# Patient Record
Sex: MALE | Race: WHITE | NOT HISPANIC OR LATINO | Employment: FULL TIME | URBAN - METROPOLITAN AREA
[De-identification: names, ages, dates, MRNs, and addresses within clinical notes are randomized per-mention and may not be internally consistent; named-entity substitution may affect disease eponyms.]

---

## 2023-08-27 PROBLEM — R20.9 SENSORY PROBLEMS WITH LIMBS: Status: ACTIVE | Noted: 2023-08-27

## 2023-08-27 PROBLEM — M79.10 MYALGIA: Status: ACTIVE | Noted: 2023-08-27

## 2023-08-27 PROBLEM — R45.0 JITTERY: Status: ACTIVE | Noted: 2023-08-27

## 2023-08-27 PROBLEM — E78.5 HYPERLIPIDEMIA: Status: ACTIVE | Noted: 2023-08-27

## 2023-08-27 PROBLEM — F32.A DEPRESSION: Status: ACTIVE | Noted: 2023-08-27

## 2023-08-27 PROBLEM — K21.9 ESOPHAGEAL REFLUX: Status: ACTIVE | Noted: 2023-08-27

## 2023-08-27 PROBLEM — E11.9 DIABETES MELLITUS WITHOUT COMPLICATION (MULTI): Status: ACTIVE | Noted: 2023-08-27

## 2023-08-27 PROBLEM — E11.9 TYPE 2 DIABETES MELLITUS (MULTI): Status: ACTIVE | Noted: 2023-08-27

## 2023-08-27 PROBLEM — R19.7 DIARRHEA: Status: ACTIVE | Noted: 2023-08-27

## 2023-08-27 PROBLEM — E55.9 VITAMIN D DEFICIENCY: Status: ACTIVE | Noted: 2023-08-27

## 2023-08-27 PROBLEM — H46.9 OPTIC NEURITIS: Status: ACTIVE | Noted: 2023-08-27

## 2023-08-27 PROBLEM — J30.1 HAY FEVER: Status: ACTIVE | Noted: 2023-08-27

## 2023-08-27 PROBLEM — N20.0 NEPHROLITHIASIS: Status: ACTIVE | Noted: 2023-08-27

## 2023-08-27 PROBLEM — E16.2 HYPOGLYCEMIA: Status: ACTIVE | Noted: 2023-08-27

## 2023-08-27 PROBLEM — R68.82 DECREASED LIBIDO: Status: ACTIVE | Noted: 2023-08-27

## 2023-08-27 PROBLEM — M50.20 OTHER CERVICAL DISC DISPLACEMENT, UNSPECIFIED CERVICAL REGION: Status: ACTIVE | Noted: 2023-08-27

## 2023-08-27 PROBLEM — L03.114 CELLULITIS OF LEFT UPPER EXTREMITY: Status: ACTIVE | Noted: 2023-08-27

## 2023-08-27 PROBLEM — N52.9 ERECTILE DYSFUNCTION: Status: ACTIVE | Noted: 2023-08-27

## 2023-08-27 PROBLEM — K58.9 IRRITABLE BOWEL SYNDROME: Status: ACTIVE | Noted: 2023-08-27

## 2023-08-27 PROBLEM — I05.9 MITRAL VALVE DISORDER: Status: ACTIVE | Noted: 2023-08-27

## 2023-08-27 PROBLEM — F41.9 ANXIETY DISORDER: Status: ACTIVE | Noted: 2023-08-27

## 2023-08-27 PROBLEM — R53.83 FATIGUE: Status: ACTIVE | Noted: 2023-08-27

## 2023-08-27 PROBLEM — H81.10 BENIGN PAROXYSMAL POSITIONAL VERTIGO: Status: ACTIVE | Noted: 2023-08-27

## 2023-08-27 PROBLEM — E78.5 DYSLIPIDEMIA: Status: ACTIVE | Noted: 2023-08-27

## 2023-08-27 RX ORDER — SIMVASTATIN 40 MG/1
40 TABLET, FILM COATED ORAL EVERY EVENING
COMMUNITY
Start: 2018-08-16 | End: 2023-11-01 | Stop reason: ENTERED-IN-ERROR

## 2023-08-27 RX ORDER — METFORMIN HYDROCHLORIDE 1000 MG/1
1000 TABLET ORAL
COMMUNITY
Start: 2015-12-15 | End: 2024-05-24 | Stop reason: SDUPTHER

## 2023-08-27 RX ORDER — NITROGLYCERIN 4 MG/G
OINTMENT RECTAL DAILY
COMMUNITY
Start: 2021-05-28 | End: 2024-02-14 | Stop reason: ALTCHOICE

## 2023-08-27 RX ORDER — SERTRALINE HYDROCHLORIDE 100 MG/1
100 TABLET, FILM COATED ORAL DAILY
COMMUNITY
End: 2024-05-24 | Stop reason: SDUPTHER

## 2023-08-27 RX ORDER — FLUTICASONE PROPIONATE 50 MCG
2 SPRAY, SUSPENSION (ML) NASAL DAILY
COMMUNITY
Start: 2012-09-05 | End: 2024-03-27 | Stop reason: SDUPTHER

## 2023-08-27 RX ORDER — NAPROXEN SODIUM 220 MG/1
TABLET ORAL
COMMUNITY
End: 2024-02-14 | Stop reason: ALTCHOICE

## 2023-08-27 RX ORDER — SERTRALINE HYDROCHLORIDE 50 MG/1
125 TABLET, FILM COATED ORAL NIGHTLY
COMMUNITY
Start: 2018-02-12 | End: 2023-11-01 | Stop reason: ALTCHOICE

## 2023-08-27 RX ORDER — CHOLECALCIFEROL (VITAMIN D3) 125 MCG
CAPSULE ORAL
COMMUNITY
End: 2023-11-01 | Stop reason: ALTCHOICE

## 2023-08-27 RX ORDER — ATORVASTATIN CALCIUM 40 MG/1
40 TABLET, FILM COATED ORAL DAILY
COMMUNITY
End: 2024-04-08

## 2023-08-27 RX ORDER — INSULIN DEGLUDEC 100 U/ML
40 INJECTION, SOLUTION SUBCUTANEOUS DAILY
COMMUNITY
End: 2023-10-23 | Stop reason: SDUPTHER

## 2023-08-27 RX ORDER — METHYLCELLULOSE 500 MG/1
1 TABLET ORAL DAILY
COMMUNITY

## 2023-08-27 RX ORDER — PEN NEEDLE, DIABETIC 30 GX3/16"
NEEDLE, DISPOSABLE MISCELLANEOUS DAILY
COMMUNITY
Start: 2020-03-12

## 2023-08-27 RX ORDER — TADALAFIL 20 MG/1
20 TABLET ORAL DAILY PRN
COMMUNITY
Start: 2021-03-31 | End: 2024-02-14 | Stop reason: ALTCHOICE

## 2023-08-27 RX ORDER — SAXAGLIPTIN 5 MG/1
1 TABLET, FILM COATED ORAL DAILY
COMMUNITY
Start: 2017-12-01 | End: 2023-11-01 | Stop reason: ENTERED-IN-ERROR

## 2023-10-23 ENCOUNTER — TELEPHONE (OUTPATIENT)
Dept: PRIMARY CARE | Facility: CLINIC | Age: 53
End: 2023-10-23
Payer: COMMERCIAL

## 2023-10-23 DIAGNOSIS — E11.9 DIABETES MELLITUS WITHOUT COMPLICATION (MULTI): Primary | ICD-10-CM

## 2023-10-23 RX ORDER — INSULIN DEGLUDEC 100 U/ML
40 INJECTION, SOLUTION SUBCUTANEOUS DAILY
Qty: 15 ML | Refills: 11 | Status: SHIPPED | OUTPATIENT
Start: 2023-10-23 | End: 2023-11-01 | Stop reason: DRUGHIGH

## 2023-10-23 NOTE — TELEPHONE ENCOUNTER
Refill     [facility]     Tresiba Flex Touch injectable pen  Pt uses 22 units daily  90 day rx w/ 3 rf's    Next visit 11/1/2023

## 2023-10-28 ENCOUNTER — LAB (OUTPATIENT)
Dept: LAB | Facility: LAB | Age: 53
End: 2023-10-28
Payer: COMMERCIAL

## 2023-10-28 DIAGNOSIS — E55.9 VITAMIN D DEFICIENCY, UNSPECIFIED: ICD-10-CM

## 2023-10-28 DIAGNOSIS — E11.9 TYPE 2 DIABETES MELLITUS WITHOUT COMPLICATIONS (MULTI): Primary | ICD-10-CM

## 2023-10-28 LAB
25(OH)D3 SERPL-MCNC: 47 NG/ML (ref 31–100)
CHOLEST SERPL-MCNC: 171 MG/DL (ref 133–200)
CHOLEST/HDLC SERPL: 4.6 {RATIO}
EST. AVERAGE GLUCOSE BLD GHB EST-MCNC: 180 MG/DL
HBA1C MFR BLD: 7.9 %
HDLC SERPL-MCNC: 37 MG/DL
LDLC SERPL CALC-MCNC: 76 MG/DL (ref 65–130)
TRIGL SERPL-MCNC: 289 MG/DL (ref 40–150)

## 2023-10-28 PROCEDURE — 82306 VITAMIN D 25 HYDROXY: CPT

## 2023-10-28 PROCEDURE — 80061 LIPID PANEL: CPT

## 2023-10-28 PROCEDURE — 83036 HEMOGLOBIN GLYCOSYLATED A1C: CPT

## 2023-10-28 PROCEDURE — 36415 COLL VENOUS BLD VENIPUNCTURE: CPT

## 2023-10-30 ASSESSMENT — ENCOUNTER SYMPTOMS
ABDOMINAL PAIN: 0
SHORTNESS OF BREATH: 0
FEVER: 0

## 2023-10-30 NOTE — PROGRESS NOTES
[facility]: [facility]  PROGRESS NOTE      Doug Peter is a 53 y.o. male that is presenting today for follow-up.    Assessment/Plan   Diagnoses and all orders for this visit:  Diabetes mellitus without complication (CMS/[facility])  -     insulin degludec (Tresiba FlexTouch U-100) 100 unit/mL (3 mL) injection; Inject 25 Units under the skin once daily.  Dyslipidemia  Anxiety disorder, unspecified type  Erectile dysfunction, unspecified erectile dysfunction type  Vitamin D deficiency  Nephrolithiasis  Other orders  -     Pneumococcal polysaccharide vaccine, 23-valent, age 2 years and older (PNEUMOVAX 23)  -     Follow Up In Primary Care - Established; Future    Would titrate up the basal insulin, options would be to add GLP-1 or prandial insulin.  Will increase to 25 units of the Tresiba and then can increase by 2 units every 3 days until the morning sugar is down around 120.  He knows that he is do better with the diet and get back into an exercise routine.    He also has some intermittent right shoulder pain, he can use over-the-counter anti-inflammatory medication and try to rest it over the next few months and if its not better will refer for orthopedics evaluation.  Subjective   For follow-up on diabetes.  The A1c was 8.3% at last check, now down to 7.9%.    He is having a lot of postprandial hyperglycemia, some of which is due to dietary indiscretion.  He is also not exercising as much as he was.    Review of Systems   Constitutional:  Negative for fever.   Respiratory:  Negative for shortness of breath.    Cardiovascular:  Negative for chest pain.   Gastrointestinal:  Negative for abdominal pain.   All other systems reviewed and are negative.     Objective   Vitals:    11/01/23 1621   BP: 120/82   Pulse: 81   Temp: 36.6 °C (97.8 °F)   SpO2: 96%      Body mass index is 25.94 kg/m².  Physical Exam  Vitals reviewed.   Constitutional:       Appearance: Normal appearance.   Cardiovascular:       "Rate and Rhythm: Normal rate and regular rhythm.      Heart sounds: No murmur heard.  Pulmonary:      Breath sounds: Normal breath sounds. No wheezing, rhonchi or rales.   Musculoskeletal:      Right lower leg: No edema.      Left lower leg: No edema.     Bilateral ears clear, tympanic membrane is normal    Diabetic foot examination:  Skin: No open areas or ulceration; scattered callus  Sensation: Intact to monofilament testing  Vascular: Palpable DP/PT pulses bilaterally      Diagnostic Results   Lab Results   Component Value Date    GLUCOSE 187 (H) 06/30/2023    CALCIUM 9.1 06/30/2023     06/30/2023    K 3.9 06/30/2023    CO2 26 06/30/2023     06/30/2023    BUN 15 06/30/2023    CREATININE 0.9 06/30/2023     Lab Results   Component Value Date    ALT 27 06/30/2023    AST 23 06/30/2023    ALKPHOS 90 06/30/2023    BILITOT 0.3 06/30/2023     Lab Results   Component Value Date    WBC 4.8 06/30/2023    HGB 16.0 06/30/2023    HCT 49.9 06/30/2023    MCV 85.0 06/30/2023     06/30/2023     Lab Results   Component Value Date    CHOL 171 10/28/2023    CHOL 204 (H) 06/30/2023    CHOL 145 09/21/2021     Lab Results   Component Value Date    HDL 37.0 (L) 10/28/2023    HDL 35 (L) 06/30/2023    HDL 36 (L) 09/21/2021     Lab Results   Component Value Date    LDLCALC 76 10/28/2023    LDLCALC  06/30/2023     Unable to report calculated LDL due to increased triglycerides. Direct    LDLCALC 70 09/21/2021     Lab Results   Component Value Date    TRIG 289 (H) 10/28/2023    TRIG 500 (H) 06/30/2023    TRIG 197 (H) 09/21/2021     No components found for: \"CHOLHDL\"  Lab Results   Component Value Date    HGBA1C 7.9 (H) 10/28/2023     Other labs not included in the list above were reviewed either before or during this encounter.    History    History reviewed. No pertinent past medical history.  Past Surgical History:   Procedure Laterality Date    LITHOTRIPSY  08/08/2013    Renal Lithotripsy    US GUIDED PERCUTANEOUS " PLACEMENT  2/25/2021    US GUIDED PERCUTANEOUS PLACEMENT [facility]     Family History   Problem Relation Name Age of Onset    Hypertension Mother      Prostate cancer Father      Diabetes Father       Social History     Socioeconomic History    Marital status:      Spouse name: Not on file    Number of children: Not on file    Years of education: Not on file    Highest education level: Not on file   Occupational History    Not on file   Tobacco Use    Smoking status: Never    Smokeless tobacco: Never   Substance and Sexual Activity    Alcohol use: Never    Drug use: Never    Sexual activity: Not on file   Other Topics Concern    Not on file   Social History Narrative    Not on file     Social Determinants of Health     Financial Resource Strain: Not on file   Food Insecurity: Not on file   Transportation Needs: Not on file   Physical Activity: Not on file   Stress: Not on file   Social Connections: Not on file   Intimate Partner Violence: Not on file   Housing Stability: Not on file     Allergies   Allergen Reactions    Glimepiride Other     Irritability    Glipizide Other     Irritability    Linagliptin Other     Irritability    Niacin Other     Head tingles    Pioglitazone Diarrhea and Other     Myalgia     Current Outpatient Medications on File Prior to Visit   Medication Sig Dispense Refill    atorvastatin (Lipitor) 40 mg tablet Take 1 tablet (40 mg) by mouth once daily.      blood sugar diagnostic strip twice a day.      empagliflozin (Jardiance) 25 mg Take 1 tablet (25 mg) by mouth once daily.      fluticasone (Flonase) 50 mcg/actuation nasal spray Administer 2 sprays into each nostril once daily.      folic acid/multivit-min/lutein (CENTRUM SILVER ORAL) Take by mouth.      metFORMIN (Glucophage) 1,000 mg tablet Take 1 tablet (1,000 mg) by mouth 2 times a day with meals.      methylcellulose, laxative, (CitruceL) 500 mg tablet Take 1 tablet by mouth 2 times a day.      nitroglycerin (Rectiv)  "0.4 % (w/w) rectal ointment Insert into the rectum once daily.      omega 3-dha-epa-fish oil (Fish OiL) 1,200 (144-216) mg capsule Take by mouth.      pen needle, diabetic 32 gauge x 5/32\" needle Inject under the skin once daily.      sertraline (Zoloft) 100 mg tablet Take 1 tablet (100 mg) by mouth once daily.      tadalafil 20 mg tablet Take 1 tablet (20 mg) by mouth once daily as needed.      [DISCONTINUED] insulin degludec (Tresiba FlexTouch U-100) 100 unit/mL (3 mL) injection Inject 40 Units under the skin once daily. 15 mL 11    [DISCONTINUED] canagliflozin (Invokana) 100 mg Take 1 tablet (100 mg) by mouth once daily.      [DISCONTINUED] cholecalciferol (Vitamin D-3) 125 MCG (5000 UT) capsule Take by mouth.      [DISCONTINUED] multivit-min/iron/folic acid/K (ADULTS MULTIVITAMIN ORAL) Take by mouth.      [DISCONTINUED] sAXagliptin (Onglyza) 5 mg tablet Take 1 tablet (5 mg) by mouth once daily.      [DISCONTINUED] sertraline (Zoloft) 50 mg tablet Take 2.5 tablets (125 mg) by mouth once daily at bedtime.      [DISCONTINUED] simvastatin (Zocor) 40 mg tablet Take 1 tablet (40 mg) by mouth once daily in the evening.       No current facility-administered medications on file prior to visit.     Immunization History   Administered Date(s) Administered    Flu vaccine (IIV4), preservative free *Check age/dose* 10/05/2021    Hepatitis B vaccine, adult (RECOMBIVAX, ENGERIX) 09/10/2019, 10/16/2019, 06/19/2020    Influenza, injectable, MDCK, preservative free, quadrivalent 10/05/2022    Influenza, injectable, quadrivalent 10/03/2019    Influenza, seasonal, injectable 11/07/2014    Pfizer COVID-19 vaccine, bivalent, age 12 years and older (30 mcg/0.3 mL) 10/19/2022    Pfizer Gray Cap SARS-CoV-2 04/15/2022    Pfizer Purple Cap SARS-CoV-2 03/25/2021, 04/22/2021, 10/26/2021    Pneumococcal polysaccharide vaccine, 23-valent, age 2 years and older (PNEUMOVAX 23) 02/07/2018    Tdap vaccine, age 7 year and older (BOOSTRIX) " 06/12/2019, 08/28/2019, 09/11/2019     Patient's medical history was reviewed and updated either before or during this encounter.    Rajat Samano MD

## 2023-11-01 ENCOUNTER — OFFICE VISIT (OUTPATIENT)
Dept: PRIMARY CARE | Facility: CLINIC | Age: 53
End: 2023-11-01
Payer: COMMERCIAL

## 2023-11-01 VITALS
OXYGEN SATURATION: 96 % | DIASTOLIC BLOOD PRESSURE: 82 MMHG | WEIGHT: 202 LBS | BODY MASS INDEX: 25.94 KG/M2 | SYSTOLIC BLOOD PRESSURE: 120 MMHG | TEMPERATURE: 97.8 F | HEART RATE: 81 BPM

## 2023-11-01 DIAGNOSIS — E78.5 DYSLIPIDEMIA: ICD-10-CM

## 2023-11-01 DIAGNOSIS — E55.9 VITAMIN D DEFICIENCY: ICD-10-CM

## 2023-11-01 DIAGNOSIS — N52.9 ERECTILE DYSFUNCTION, UNSPECIFIED ERECTILE DYSFUNCTION TYPE: ICD-10-CM

## 2023-11-01 DIAGNOSIS — N20.0 NEPHROLITHIASIS: ICD-10-CM

## 2023-11-01 DIAGNOSIS — F41.9 ANXIETY DISORDER, UNSPECIFIED TYPE: ICD-10-CM

## 2023-11-01 DIAGNOSIS — E11.9 DIABETES MELLITUS WITHOUT COMPLICATION (MULTI): Primary | ICD-10-CM

## 2023-11-01 PROCEDURE — 1036F TOBACCO NON-USER: CPT | Performed by: INTERNAL MEDICINE

## 2023-11-01 PROCEDURE — 3079F DIAST BP 80-89 MM HG: CPT | Performed by: INTERNAL MEDICINE

## 2023-11-01 PROCEDURE — 95251 CONT GLUC MNTR ANALYSIS I&R: CPT | Performed by: INTERNAL MEDICINE

## 2023-11-01 PROCEDURE — 3074F SYST BP LT 130 MM HG: CPT | Performed by: INTERNAL MEDICINE

## 2023-11-01 PROCEDURE — 3048F LDL-C <100 MG/DL: CPT | Performed by: INTERNAL MEDICINE

## 2023-11-01 PROCEDURE — 3051F HG A1C>EQUAL 7.0%<8.0%: CPT | Performed by: INTERNAL MEDICINE

## 2023-11-01 PROCEDURE — 99214 OFFICE O/P EST MOD 30 MIN: CPT | Performed by: INTERNAL MEDICINE

## 2023-11-01 RX ORDER — INSULIN DEGLUDEC 100 U/ML
20 INJECTION, SOLUTION SUBCUTANEOUS DAILY
Qty: 15 ML | Refills: 11 | Status: SHIPPED | OUTPATIENT
Start: 2023-11-01 | End: 2023-11-01 | Stop reason: DRUGHIGH

## 2023-11-01 RX ORDER — INSULIN DEGLUDEC 100 U/ML
25 INJECTION, SOLUTION SUBCUTANEOUS DAILY
Qty: 15 ML | Refills: 11 | Status: SHIPPED | OUTPATIENT
Start: 2023-11-01 | End: 2024-05-24 | Stop reason: SDUPTHER

## 2023-11-01 ASSESSMENT — PATIENT HEALTH QUESTIONNAIRE - PHQ9
2. FEELING DOWN, DEPRESSED OR HOPELESS: NOT AT ALL
SUM OF ALL RESPONSES TO PHQ9 QUESTIONS 1 AND 2: 0
1. LITTLE INTEREST OR PLEASURE IN DOING THINGS: NOT AT ALL

## 2023-11-01 ASSESSMENT — PAIN SCALES - GENERAL: PAINLEVEL: 7

## 2023-11-01 ASSESSMENT — ENCOUNTER SYMPTOMS
OCCASIONAL FEELINGS OF UNSTEADINESS: 0
LOSS OF SENSATION IN FEET: 0
DEPRESSION: 0

## 2023-11-01 NOTE — PATIENT INSTRUCTIONS
INCREASE TRESIBA TO 25 UNITS FOR THE NEXT 3 DAYS, THEN YOU CAN INCREASE BY 2 ADDITIONAL UNITS EVERY 3 DAYS UNTIL YOUR MORNING SUGAR  OR LOWER OR YOU REACH A MAXIMUM DOSE OF 36 UNITS. LET US KNOW IF YOU GET OVERNIGHT LOW BLOOD SUGARS.

## 2024-02-13 ASSESSMENT — ENCOUNTER SYMPTOMS
SHORTNESS OF BREATH: 0
ABDOMINAL PAIN: 0
FEVER: 0

## 2024-02-13 NOTE — PROGRESS NOTES
[facility]: [facility]  PROGRESS NOTE      Doug Peter is a 53 y.o. male that is presenting today for Follow-up.    Assessment/Plan   Diagnoses and all orders for this visit:  Diabetes mellitus without complication (CMS/HCC)  -     POCT glycosylated hemoglobin (Hb A1C) manually resulted  -     semaglutide 0.25 mg or 0.5 mg (2 mg/3 mL) pen injector; Inject 0.25 mg under the skin 1 (one) time per week.  Dyslipidemia  Anxiety disorder, unspecified type  Vitamin D deficiency  Erectile dysfunction, unspecified erectile dysfunction type  Other orders  -     Follow Up In Primary Care - Established  -     Follow Up In Primary Care - Established; Future    Would like to start GLP - discussed pros/cons/adverse effects.  Perhaps we can stop SGLT2 in time.  To see pharmacist in 6 weeks and me at regular check up.    Lipids stable on atorvastatin.    Mood seems stable on the sertraline.    Subjective   HPI  53 y.o. male here for follow up.  We reviewed the continuous glucose monitor data and his fasting sugars are good.  No hypoglycemia.  The main issue is the postprandial hyperglycemia.  Continuous glucose monitor estimates his A1c to be 7.8% A1c by point-of-care testing is 7.9% today.  He has not been doing much exercise and knows that he has had an increase in abdominal adiposity.    Review of Systems   Constitutional:  Negative for fever.   Respiratory:  Negative for shortness of breath.    Cardiovascular:  Negative for chest pain.   Gastrointestinal:  Negative for abdominal pain.   All other systems reviewed and are negative.     Objective   Vitals:    02/14/24 1551   BP: 138/72   Pulse: 81   Temp: 36.5 °C (97.7 °F)   SpO2: 97%      Body mass index is 26.58 kg/m².  Physical Exam  Vitals reviewed.   Constitutional:       Appearance: Normal appearance.   Cardiovascular:      Rate and Rhythm: Normal rate and regular rhythm.      Heart sounds: No murmur heard.  Pulmonary:      Breath sounds: Normal  "breath sounds. No wheezing, rhonchi or rales.   Musculoskeletal:      Right lower leg: No edema.      Left lower leg: No edema.       Diagnostic Results   Lab Results   Component Value Date    GLUCOSE 187 (H) 06/30/2023    CALCIUM 9.1 06/30/2023     06/30/2023    K 3.9 06/30/2023    CO2 26 06/30/2023     06/30/2023    BUN 15 06/30/2023    CREATININE 0.9 06/30/2023     Lab Results   Component Value Date    ALT 27 06/30/2023    AST 23 06/30/2023    ALKPHOS 90 06/30/2023    BILITOT 0.3 06/30/2023     Lab Results   Component Value Date    WBC 4.8 06/30/2023    HGB 16.0 06/30/2023    HCT 49.9 06/30/2023    MCV 85.0 06/30/2023     06/30/2023     Lab Results   Component Value Date    CHOL 171 10/28/2023    CHOL 204 (H) 06/30/2023    CHOL 145 09/21/2021     Lab Results   Component Value Date    HDL 37.0 (L) 10/28/2023    HDL 35 (L) 06/30/2023    HDL 36 (L) 09/21/2021     Lab Results   Component Value Date    LDLCALC 76 10/28/2023    LDLCALC  06/30/2023     Unable to report calculated LDL due to increased triglycerides. Direct    LDLCALC 70 09/21/2021     Lab Results   Component Value Date    TRIG 289 (H) 10/28/2023    TRIG 500 (H) 06/30/2023    TRIG 197 (H) 09/21/2021     No components found for: \"CHOLHDL\"  Lab Results   Component Value Date    HGBA1C 7.9 (A) 02/14/2024     Other labs not included in the list above were reviewed either before or during this encounter.    History    History reviewed. No pertinent past medical history.  Past Surgical History:   Procedure Laterality Date    LITHOTRIPSY  08/08/2013    Renal Lithotripsy    US GUIDED PERCUTANEOUS PLACEMENT  2/25/2021    US GUIDED PERCUTANEOUS PLACEMENT [facility]     Family History   Problem Relation Name Age of Onset    Hypertension Mother      Prostate cancer Father      Diabetes Father       Social History     Socioeconomic History    Marital status:      Spouse name: Not on file    Number of children: Not on file    Years " "of education: Not on file    Highest education level: Not on file   Occupational History    Not on file   Tobacco Use    Smoking status: Never    Smokeless tobacco: Never   Substance and Sexual Activity    Alcohol use: Never    Drug use: Never    Sexual activity: Not on file   Other Topics Concern    Not on file   Social History Narrative    Not on file     Social Determinants of Health     Financial Resource Strain: Not on file   Food Insecurity: Not on file   Transportation Needs: Not on file   Physical Activity: Not on file   Stress: Not on file   Social Connections: Not on file   Intimate Partner Violence: Not on file   Housing Stability: Not on file     Allergies   Allergen Reactions    Glimepiride Other     Irritability    Glipizide Other     Irritability    Linagliptin Other     Irritability    Niacin Other     Head tingles    Pioglitazone Diarrhea and Other     Myalgia     Current Outpatient Medications on File Prior to Visit   Medication Sig Dispense Refill    atorvastatin (Lipitor) 40 mg tablet Take 1 tablet (40 mg) by mouth once daily.      blood sugar diagnostic strip twice a day.      empagliflozin (Jardiance) 25 mg Take 1 tablet (25 mg) by mouth once daily.      fluticasone (Flonase) 50 mcg/actuation nasal spray Administer 2 sprays into each nostril once daily.      folic acid/multivit-min/lutein (CENTRUM SILVER ORAL) Take by mouth.      insulin degludec (Tresiba FlexTouch U-100) 100 unit/mL (3 mL) injection Inject 25 Units under the skin once daily. (Patient taking differently: Inject 26 Units under the skin once daily.) 15 mL 11    metFORMIN (Glucophage) 1,000 mg tablet Take 1 tablet (1,000 mg) by mouth 2 times a day with meals.      methylcellulose, laxative, (CitruceL) 500 mg tablet Take 1 tablet (500 mg) by mouth 2 times a day.      pen needle, diabetic 32 gauge x 5/32\" needle Inject under the skin once daily.      sertraline (Zoloft) 100 mg tablet Take 1 tablet (100 mg) by mouth once daily.      " [DISCONTINUED] nitroglycerin (Rectiv) 0.4 % (w/w) rectal ointment Insert into the rectum once daily.      [DISCONTINUED] tadalafil 20 mg tablet Take 1 tablet (20 mg) by mouth once daily as needed.      [DISCONTINUED] omega 3-dha-epa-fish oil (Fish OiL) 1,200 (144-216) mg capsule Take by mouth.       No current facility-administered medications on file prior to visit.     Immunization History   Administered Date(s) Administered    Flu vaccine (IIV4), preservative free *Check age/dose* 10/05/2021, 10/19/2023    Flu vaccine, quadrivalent, no egg protein, age 6 month or greater (FLUCELVAX) 10/05/2022    Hepatitis B vaccine, adult (RECOMBIVAX, ENGERIX) 09/10/2019, 10/16/2019, 06/19/2020    Influenza, injectable, quadrivalent 10/03/2019    Influenza, seasonal, injectable 11/07/2014    Pfizer COVID-19 vaccine, Fall 2023, 12 years and older, (30mcg/0.3mL) 11/08/2023    Pfizer COVID-19 vaccine, bivalent, age 12 years and older (30 mcg/0.3 mL) 10/19/2022    Pfizer Gray Cap SARS-CoV-2 04/15/2022    Pfizer Purple Cap SARS-CoV-2 03/25/2021, 04/22/2021, 10/26/2021    Pneumococcal polysaccharide vaccine, 23-valent, age 2 years and older (PNEUMOVAX 23) 02/07/2018    Tdap vaccine, age 7 year and older (BOOSTRIX, ADACEL) 06/12/2019, 08/28/2019, 09/11/2019     Patient's medical history was reviewed and updated either before or during this encounter.       Rajat Samano MD

## 2024-02-14 ENCOUNTER — OFFICE VISIT (OUTPATIENT)
Dept: PRIMARY CARE | Facility: CLINIC | Age: 54
End: 2024-02-14
Payer: COMMERCIAL

## 2024-02-14 VITALS
HEIGHT: 74 IN | TEMPERATURE: 97.7 F | HEART RATE: 81 BPM | BODY MASS INDEX: 26.56 KG/M2 | SYSTOLIC BLOOD PRESSURE: 138 MMHG | OXYGEN SATURATION: 97 % | DIASTOLIC BLOOD PRESSURE: 72 MMHG | WEIGHT: 207 LBS

## 2024-02-14 DIAGNOSIS — F41.9 ANXIETY DISORDER, UNSPECIFIED TYPE: ICD-10-CM

## 2024-02-14 DIAGNOSIS — E11.9 DIABETES MELLITUS WITHOUT COMPLICATION (MULTI): Primary | ICD-10-CM

## 2024-02-14 DIAGNOSIS — E55.9 VITAMIN D DEFICIENCY: ICD-10-CM

## 2024-02-14 DIAGNOSIS — N52.9 ERECTILE DYSFUNCTION, UNSPECIFIED ERECTILE DYSFUNCTION TYPE: ICD-10-CM

## 2024-02-14 DIAGNOSIS — E78.5 DYSLIPIDEMIA: ICD-10-CM

## 2024-02-14 LAB — POC HEMOGLOBIN A1C: 7.9 % (ref 4.2–6.5)

## 2024-02-14 PROCEDURE — 1036F TOBACCO NON-USER: CPT | Performed by: INTERNAL MEDICINE

## 2024-02-14 PROCEDURE — 3075F SYST BP GE 130 - 139MM HG: CPT | Performed by: INTERNAL MEDICINE

## 2024-02-14 PROCEDURE — 3078F DIAST BP <80 MM HG: CPT | Performed by: INTERNAL MEDICINE

## 2024-02-14 PROCEDURE — 95251 CONT GLUC MNTR ANALYSIS I&R: CPT | Performed by: INTERNAL MEDICINE

## 2024-02-14 PROCEDURE — 83036 HEMOGLOBIN GLYCOSYLATED A1C: CPT | Performed by: INTERNAL MEDICINE

## 2024-02-14 PROCEDURE — 99214 OFFICE O/P EST MOD 30 MIN: CPT | Performed by: INTERNAL MEDICINE

## 2024-02-14 ASSESSMENT — LIFESTYLE VARIABLES
HOW OFTEN DURING THE LAST YEAR HAVE YOU NEEDED AN ALCOHOLIC DRINK FIRST THING IN THE MORNING TO GET YOURSELF GOING AFTER A NIGHT OF HEAVY DRINKING: NEVER
HOW OFTEN DURING THE LAST YEAR HAVE YOU FAILED TO DO WHAT WAS NORMALLY EXPECTED FROM YOU BECAUSE OF DRINKING: NEVER
AUDIT TOTAL SCORE: 0
SKIP TO QUESTIONS 9-10: 1
HOW OFTEN DURING THE LAST YEAR HAVE YOU FOUND THAT YOU WERE NOT ABLE TO STOP DRINKING ONCE YOU HAD STARTED: NEVER
HOW OFTEN DO YOU HAVE A DRINK CONTAINING ALCOHOL: NEVER
HOW OFTEN DURING THE LAST YEAR HAVE YOU BEEN UNABLE TO REMEMBER WHAT HAPPENED THE NIGHT BEFORE BECAUSE YOU HAD BEEN DRINKING: NEVER
HAS A RELATIVE, FRIEND, DOCTOR, OR ANOTHER HEALTH PROFESSIONAL EXPRESSED CONCERN ABOUT YOUR DRINKING OR SUGGESTED YOU CUT DOWN: NO
HOW MANY STANDARD DRINKS CONTAINING ALCOHOL DO YOU HAVE ON A TYPICAL DAY: PATIENT DOES NOT DRINK
HOW OFTEN DO YOU HAVE SIX OR MORE DRINKS ON ONE OCCASION: NEVER
HAVE YOU OR SOMEONE ELSE BEEN INJURED AS A RESULT OF YOUR DRINKING: NO
AUDIT-C TOTAL SCORE: 0
HOW OFTEN DURING THE LAST YEAR HAVE YOU HAD A FEELING OF GUILT OR REMORSE AFTER DRINKING: NEVER

## 2024-02-14 ASSESSMENT — PATIENT HEALTH QUESTIONNAIRE - PHQ9
SUM OF ALL RESPONSES TO PHQ9 QUESTIONS 1 AND 2: 0
2. FEELING DOWN, DEPRESSED OR HOPELESS: NOT AT ALL
1. LITTLE INTEREST OR PLEASURE IN DOING THINGS: NOT AT ALL

## 2024-02-14 ASSESSMENT — ENCOUNTER SYMPTOMS
LOSS OF SENSATION IN FEET: 0
OCCASIONAL FEELINGS OF UNSTEADINESS: 0
DEPRESSION: 0

## 2024-02-14 ASSESSMENT — PAIN SCALES - GENERAL: PAINLEVEL: 0-NO PAIN

## 2024-03-04 ENCOUNTER — OFFICE VISIT (OUTPATIENT)
Dept: ORTHOPEDIC SURGERY | Facility: CLINIC | Age: 54
End: 2024-03-04
Payer: COMMERCIAL

## 2024-03-04 ENCOUNTER — HOSPITAL ENCOUNTER (OUTPATIENT)
Dept: RADIOLOGY | Facility: CLINIC | Age: 54
Discharge: HOME | End: 2024-03-04
Payer: COMMERCIAL

## 2024-03-04 DIAGNOSIS — M25.511 RIGHT SHOULDER PAIN, UNSPECIFIED CHRONICITY: ICD-10-CM

## 2024-03-04 DIAGNOSIS — E11.9 TYPE 2 DIABETES MELLITUS WITHOUT COMPLICATION, UNSPECIFIED WHETHER LONG TERM INSULIN USE (MULTI): ICD-10-CM

## 2024-03-04 DIAGNOSIS — M75.41 IMPINGEMENT SYNDROME OF SHOULDER, RIGHT: Primary | ICD-10-CM

## 2024-03-04 DIAGNOSIS — M50.20 OTHER CERVICAL DISC DISPLACEMENT, UNSPECIFIED CERVICAL REGION: ICD-10-CM

## 2024-03-04 PROCEDURE — 20611 DRAIN/INJ JOINT/BURSA W/US: CPT | Performed by: ORTHOPAEDIC SURGERY

## 2024-03-04 PROCEDURE — 1036F TOBACCO NON-USER: CPT | Performed by: ORTHOPAEDIC SURGERY

## 2024-03-04 PROCEDURE — 73030 X-RAY EXAM OF SHOULDER: CPT | Mod: RT

## 2024-03-04 PROCEDURE — 73030 X-RAY EXAM OF SHOULDER: CPT | Mod: RIGHT SIDE | Performed by: RADIOLOGY

## 2024-03-04 RX ADMIN — TRIAMCINOLONE ACETONIDE 30 MG: 40 INJECTION, SUSPENSION INTRA-ARTICULAR; INTRAMUSCULAR at 15:03

## 2024-03-04 RX ADMIN — LIDOCAINE HYDROCHLORIDE 0.5 ML: 10 INJECTION INFILTRATION; PERINEURAL at 15:03

## 2024-03-04 ASSESSMENT — ENCOUNTER SYMPTOMS
ARTHRALGIAS: 1
SHORTNESS OF BREATH: 0
FEVER: 0
NECK PAIN: 1
FATIGUE: 0
WHEEZING: 0
CHILLS: 0

## 2024-03-04 ASSESSMENT — PAIN - FUNCTIONAL ASSESSMENT: PAIN_FUNCTIONAL_ASSESSMENT: 0-10

## 2024-03-04 ASSESSMENT — PAIN SCALES - GENERAL: PAINLEVEL_OUTOF10: 1

## 2024-03-04 NOTE — PROGRESS NOTES
Reason for Appointment  Chief Complaint   Patient presents with    Right Shoulder - Pain     History of Present Illness  New patient is a 54 y.o. male here today for evaluation of right shoulder pain. X-rays today are normal, no significant DJD throughout. He noticed pain in the shoulder over the summer. Pain is lateral and worse with lifting. He has a history of cervical disc displacement, but his shoulder symptoms are distinct. The pain in his shoulder may have started after a lot of heavy lifting.     History reviewed. No pertinent past medical history.    Past Surgical History:   Procedure Laterality Date    LITHOTRIPSY  08/08/2013    Renal Lithotripsy    US GUIDED PERCUTANEOUS PLACEMENT  2/25/2021    US GUIDED PERCUTANEOUS PLACEMENT [facility]       Medication Documentation Review Audit       Reviewed by Christie Allen MA (Medical Assistant) on 03/04/24 at 1447      Medication Order Taking? Sig Documenting Provider Last Dose Status   atorvastatin (Lipitor) 40 mg tablet [id number] Yes Take 1 tablet (40 mg) by mouth once daily. Historical Provider, MD Taking Active   blood sugar diagnostic strip [id number] Yes twice a day. Historical Provider, MD Taking Active   empagliflozin (Jardiance) 25 mg [id number] Yes Take 1 tablet (25 mg) by mouth once daily. Historical Provider, MD Taking Active   fluticasone (Flonase) 50 mcg/actuation nasal spray [id number] Yes Administer 2 sprays into each nostril once daily. Historical Provider, MD Taking Active   folic acid/multivit-min/lutein (CENTRUM SILVER ORAL) [id number] Yes Take by mouth. Historical Provider, MD Taking Active   insulin degludec (Tresiba FlexTouch U-100) 100 unit/mL (3 mL) injection [id number] Yes Inject 25 Units under the skin once daily.   Patient taking differently: Inject 26 Units under the skin once daily.    Rajat Samano MD Taking Active   metFORMIN (Glucophage) 1,000 mg tablet [id number] Yes Take 1 tablet (1,000 mg) by mouth 2 times a day  "with meals. Historical Provider, MD Taking Active   methylcellulose, laxative, (CitruceL) 500 mg tablet [id number] Yes Take 1 tablet (500 mg) by mouth 2 times a day. Historical Provider, MD Taking Active   pen needle, diabetic 32 gauge x 5/32\" needle [id number] Yes Inject under the skin once daily. Historical Provider, MD Taking Active   semaglutide 0.25 mg or 0.5 mg (2 mg/3 mL) pen injector [id number] Yes Inject 0.25 mg under the skin 1 (one) time per week. Rajat Samano MD Taking Active   sertraline (Zoloft) 100 mg tablet [id number] Yes Take 1 tablet (100 mg) by mouth once daily. Historical Provider, MD Taking Active                    Allergies   Allergen Reactions    Glimepiride Other     Irritability    Glipizide Other     Irritability    Linagliptin Other     Irritability    Niacin Other     Head tingles    Pioglitazone Diarrhea and Other     Myalgia       Review of Systems   Constitutional:  Negative for chills, fatigue and fever.   Respiratory:  Negative for shortness of breath and wheezing.    Cardiovascular:  Negative for chest pain and leg swelling.   Musculoskeletal:  Positive for arthralgias and neck pain.   All other systems reviewed and are negative.    Exam   On exam, there is some periscapular tenderness, symmetric forward flexion, good deltoid function, symmetric rotation, mild weakness in external rotation on the right, markedly positive impingement sign on right, no biceps or joint line tenderness, no AC or SC joint tenderness, good elbow wrist and hand ROM, good pulses, good sensation    Assessment   Encounter Diagnoses   Name Primary?    Right shoulder pain, unspecified chronicity     Other cervical disc displacement, unspecified cervical region     Impingement syndrome of shoulder, right Yes    Type 2 diabetes mellitus without complication, unspecified whether long term insulin use (CMS/[facility])        Plan   He is getting bursitis symptoms in the right shoulder and with mild weakness there may " be a small cuff tear but this weakness could also be due to inflammation. I would like to start with one cortisone injection to the right subacromial space. He will follow-up in 6 months to check his cuff strength. We discussed the risk of increased blood sugar with his DM. I will use     Today we discussed conservative treatment. At this point, the patient is experiencing increased right shoulder pain that is consistent with impingement syndrome on clinical examination with positive impingement signs. We will do one cortisone injection into the right subacromial space in hopes to calm their symptoms nicely. Pt understands the small risk of infection and warning signs including flare reaction.     Patient ID: Doug Peter is a 54 y.o. male.    L Inj/Asp: R subacromial bursa on 3/4/2024 3:03 PM  Indications: pain  Details: 22 G needle, ultrasound-guided  Medications: 30 mg triamcinolone acetonide 40 mg/mL; 0.5 mL lidocaine 10 mg/mL (1 %)  Outcome: tolerated well, no immediate complications    After discussing the risks and benefits of the procedure with proceeded with an injection.  Using ultrasound guidance we identified the acromion, humeral head and the subacromial bursa, images obtained. We then sterilely injected the right subacrominal space with a mixture of 30 mg of Kenalog and 1 cc of 1 % lidocaine. Pt tolerated the procedure well without any adverse reactions.    Procedure, treatment alternatives, risks and benefits explained, specific risks discussed. Consent was given by the patient. Immediately prior to procedure a time out was called to verify the correct patient, procedure, equipment, support staff and site/side marked as required. Patient was prepped and draped in the usual sterile fashion.           Alma GARIBAY, attest that this documentation has been prepared under the direction and in the presence of Corbin Joseph MD. By signing below, Corbin GAIRBAY MD, personally performed the services  described in this documentation. All medical record entries made by the scribe were at my direction and in my presence. I have reviewed the chart and agree that the record reflects my personal performance and is accurate and complete. 03/04/24

## 2024-03-05 RX ORDER — LIDOCAINE HYDROCHLORIDE 10 MG/ML
0.5 INJECTION INFILTRATION; PERINEURAL
Status: COMPLETED | OUTPATIENT
Start: 2024-03-04 | End: 2024-03-04

## 2024-03-05 RX ORDER — TRIAMCINOLONE ACETONIDE 40 MG/ML
30 INJECTION, SUSPENSION INTRA-ARTICULAR; INTRAMUSCULAR
Status: COMPLETED | OUTPATIENT
Start: 2024-03-04 | End: 2024-03-04

## 2024-03-27 ENCOUNTER — CLINICAL SUPPORT (OUTPATIENT)
Dept: PRIMARY CARE | Facility: CLINIC | Age: 54
End: 2024-03-27
Payer: COMMERCIAL

## 2024-03-27 ENCOUNTER — TELEPHONE (OUTPATIENT)
Dept: PRIMARY CARE | Facility: CLINIC | Age: 54
End: 2024-03-27

## 2024-03-27 VITALS — WEIGHT: 202 LBS | BODY MASS INDEX: 25.94 KG/M2

## 2024-03-27 DIAGNOSIS — J30.1 HAY FEVER: Primary | ICD-10-CM

## 2024-03-27 DIAGNOSIS — E11.9 DIABETES MELLITUS WITHOUT COMPLICATION (MULTI): Primary | ICD-10-CM

## 2024-03-27 DIAGNOSIS — E11.9 DIABETES MELLITUS WITHOUT COMPLICATION (MULTI): ICD-10-CM

## 2024-03-27 RX ORDER — DULAGLUTIDE 0.75 MG/.5ML
0.75 INJECTION, SOLUTION SUBCUTANEOUS
Qty: 2 ML | Refills: 2 | Status: SHIPPED | OUTPATIENT
Start: 2024-03-27 | End: 2024-05-24 | Stop reason: WASHOUT

## 2024-03-27 RX ORDER — FLUTICASONE PROPIONATE 50 MCG
2 SPRAY, SUSPENSION (ML) NASAL DAILY
Qty: 16 G | Refills: 11 | Status: SHIPPED | OUTPATIENT
Start: 2024-03-27

## 2024-03-27 NOTE — PROGRESS NOTES
Doug presents today doing well since his most recent visit. Here for 1 month check after starting Ozempic 0.25 mg. Patient reports significant stomach upset and appetite suppression with ozempic, has tried to work through it but it is affecting his daily life.     Patient's blood sugars have been doing much better, estimated A1c (GMI) from visit on 2/14/24 was 7.8% with 49% in range. GMI today  was 6.8% and 77% in range, no lows reported.     Patient is willing to try out a different GLP1 - will stop Ozempic and start Trulicity 0.75 mg once daily. Continue Jardiance 25 mg every day, Metformin 1 g BID, Toujeo 26 units once daily.     Treatment and plan discussed with  . JONNY Anthony, PharmD, BCPS

## 2024-04-08 DIAGNOSIS — E78.5 HYPERLIPIDEMIA, UNSPECIFIED: ICD-10-CM

## 2024-04-08 RX ORDER — ATORVASTATIN CALCIUM 40 MG/1
40 TABLET, FILM COATED ORAL DAILY
Qty: 90 TABLET | Refills: 3 | Status: SHIPPED | OUTPATIENT
Start: 2024-04-08

## 2024-04-19 ENCOUNTER — HOSPITAL ENCOUNTER (OUTPATIENT)
Dept: RADIOLOGY | Facility: CLINIC | Age: 54
Discharge: HOME | End: 2024-04-19
Payer: COMMERCIAL

## 2024-04-19 DIAGNOSIS — N20.0 CALCULUS OF KIDNEY: ICD-10-CM

## 2024-04-19 PROCEDURE — 74018 RADEX ABDOMEN 1 VIEW: CPT

## 2024-05-23 ASSESSMENT — ENCOUNTER SYMPTOMS
FEVER: 0
ABDOMINAL PAIN: 0
SHORTNESS OF BREATH: 0

## 2024-05-23 NOTE — PROGRESS NOTES
[facility]: [facility]  PROGRESS NOTE      Doug Peter is a 54 y.o. male that is presenting today for Follow-up.    Assessment/Plan   Diagnoses and all orders for this visit:  Diabetes mellitus without complication (Multi)  -     POCT glycosylated hemoglobin (Hb A1C) manually resulted  -     CBC and Auto Differential; Future  -     Comprehensive Metabolic Panel; Future  -     Lipid Panel; Future  -     Hemoglobin A1C; Future  -     Vitamin D 25-Hydroxy,Total (for eval of Vitamin D levels); Future  -     TSH with reflex to Free T4 if abnormal; Future  -     Prostate Specific Antigen; Future  -     Albumin , Urine Random; Future  -     insulin degludec (Tresiba FlexTouch U-100) 100 unit/mL (3 mL) injection; Inject 26 Units under the skin once daily.  -     metFORMIN (Glucophage) 1,000 mg tablet; Take 1 tablet (1,000 mg) by mouth 2 times daily (morning and late afternoon).  -     empagliflozin (Jardiance) 25 mg; Take 1 tablet (25 mg) by mouth once daily.  Dyslipidemia  Anxiety disorder, unspecified type  -     sertraline (Zoloft) 100 mg tablet; Take 1 tablet (100 mg) by mouth once daily.  Vitamin D deficiency  -     Vitamin D 25-Hydroxy,Total (for eval of Vitamin D levels); Future  Nephrolithiasis  Erectile dysfunction, unspecified erectile dysfunction type  Encounter for routine laboratory testing  -     CBC and Auto Differential; Future  -     Comprehensive Metabolic Panel; Future  -     Lipid Panel; Future  -     Hemoglobin A1C; Future  -     Vitamin D 25-Hydroxy,Total (for eval of Vitamin D levels); Future  -     TSH with reflex to Free T4 if abnormal; Future  -     Prostate Specific Antigen; Future  -     Albumin , Urine Random; Future  Encounter for prostate cancer screening  -     Prostate Specific Antigen; Future  Sinusitis, unspecified chronicity, unspecified location  -     amoxicillin-pot clavulanate (Augmentin) 875-125 mg tablet; Take 1 tablet by mouth 2 times a day for 10  days.    Lungs clear.  Suspect sinusitis, developing bronchitis.  Treat w/ amox-clav.    DM under better control overall, no changes now.    Lipids stable on the atorvastatin and BP is excellent.    Subjective   HPI  54 y.o. male here for follow up.  Cough, congestion, settling in chest, coughing up green mucus, thick.    Review of Systems   Constitutional:  Negative for fever.   Respiratory:  Negative for shortness of breath.    Cardiovascular:  Negative for chest pain.   Gastrointestinal:  Negative for abdominal pain.   All other systems reviewed and are negative.     Objective   Vitals:    05/24/24 1418   BP: 118/60   Pulse: 84   Temp: 36.7 °C (98.1 °F)   SpO2: 97%      Body mass index is 25.68 kg/m².  Physical Exam  Vitals reviewed.   Constitutional:       Appearance: Normal appearance.   Cardiovascular:      Rate and Rhythm: Normal rate and regular rhythm.      Heart sounds: No murmur heard.  Pulmonary:      Breath sounds: Normal breath sounds. No wheezing, rhonchi or rales.   Musculoskeletal:      Right lower leg: No edema.      Left lower leg: No edema.       Diagnostic Results   Lab Results   Component Value Date    GLUCOSE 187 (H) 06/30/2023    CALCIUM 9.1 06/30/2023     06/30/2023    K 3.9 06/30/2023    CO2 26 06/30/2023     06/30/2023    BUN 15 06/30/2023    CREATININE 0.9 06/30/2023     Lab Results   Component Value Date    ALT 27 06/30/2023    AST 23 06/30/2023    ALKPHOS 90 06/30/2023    BILITOT 0.3 06/30/2023     Lab Results   Component Value Date    WBC 4.8 06/30/2023    HGB 16.0 06/30/2023    HCT 49.9 06/30/2023    MCV 85.0 06/30/2023     06/30/2023     Lab Results   Component Value Date    CHOL 171 10/28/2023    CHOL 204 (H) 06/30/2023    CHOL 145 09/21/2021     Lab Results   Component Value Date    HDL 37.0 (L) 10/28/2023    HDL 35 (L) 06/30/2023    HDL 36 (L) 09/21/2021     Lab Results   Component Value Date    LDLCALC 76 10/28/2023    LDLCALC  06/30/2023     Unable to report  "calculated LDL due to increased triglycerides. Direct    LDLCALC 70 09/21/2021     Lab Results   Component Value Date    TRIG 289 (H) 10/28/2023    TRIG 500 (H) 06/30/2023    TRIG 197 (H) 09/21/2021     No components found for: \"CHOLHDL\"  Lab Results   Component Value Date    HGBA1C 6.9 (A) 05/24/2024     Other labs not included in the list above were reviewed either before or during this encounter.    History    History reviewed. No pertinent past medical history.  Past Surgical History:   Procedure Laterality Date    LITHOTRIPSY  08/08/2013    Renal Lithotripsy    US GUIDED PERCUTANEOUS PLACEMENT  2/25/2021    US GUIDED PERCUTANEOUS PLACEMENT [facility]     Family History   Problem Relation Name Age of Onset    Hypertension Mother      Prostate cancer Father      Diabetes Father       Social History     Socioeconomic History    Marital status:      Spouse name: Not on file    Number of children: Not on file    Years of education: Not on file    Highest education level: Not on file   Occupational History    Not on file   Tobacco Use    Smoking status: Never    Smokeless tobacco: Never   Substance and Sexual Activity    Alcohol use: Never    Drug use: Never    Sexual activity: Defer   Other Topics Concern    Not on file   Social History Narrative    Not on file     Social Determinants of Health     Financial Resource Strain: Not on file   Food Insecurity: Not on file   Transportation Needs: Not on file   Physical Activity: Not on file   Stress: Not on file   Social Connections: Not on file   Intimate Partner Violence: Not on file   Housing Stability: Not on file     Allergies   Allergen Reactions    Glimepiride Other     Irritability    Glipizide Other     Irritability    Linagliptin Other     Irritability    Niacin Other     Head tingles    Pioglitazone Diarrhea and Other     Myalgia     Current Outpatient Medications on File Prior to Visit   Medication Sig Dispense Refill    atorvastatin (Lipitor) " "40 mg tablet TAKE 1 TABLET BY MOUTH EVERY DAY 90 tablet 3    blood sugar diagnostic strip twice a day.      fluticasone (Flonase) 50 mcg/actuation nasal spray Administer 2 sprays into each nostril once daily. 16 g 11    folic acid/multivit-min/lutein (CENTRUM SILVER ORAL) Take by mouth.      methylcellulose, laxative, (CitruceL) 500 mg tablet Take 1 tablet (500 mg) by mouth once daily.      pen needle, diabetic 32 gauge x 5/32\" needle Inject under the skin once daily.      [DISCONTINUED] empagliflozin (Jardiance) 25 mg Take 1 tablet (25 mg) by mouth once daily.      [DISCONTINUED] insulin degludec (Tresiba FlexTouch U-100) 100 unit/mL (3 mL) injection Inject 25 Units under the skin once daily. (Patient taking differently: Inject 26 Units under the skin once daily.) 15 mL 11    [DISCONTINUED] metFORMIN (Glucophage) 1,000 mg tablet Take 1 tablet (1,000 mg) by mouth 2 times daily (morning and late afternoon).      [DISCONTINUED] sertraline (Zoloft) 100 mg tablet Take 1 tablet (100 mg) by mouth once daily.      [DISCONTINUED] dulaglutide (Trulicity) 0.75 mg/0.5 mL pen injector Inject 0.75 mg under the skin 1 (one) time per week. (Patient not taking: Reported on 5/24/2024) 2 mL 2     No current facility-administered medications on file prior to visit.     Immunization History   Administered Date(s) Administered    Flu vaccine (IIV4), preservative free *Check age/dose* 10/05/2021, 10/19/2023    Flu vaccine, quadrivalent, no egg protein, age 6 month or greater (FLUCELVAX) 10/05/2022    Hepatitis B vaccine, adult (RECOMBIVAX, ENGERIX) 09/10/2019, 10/16/2019, 06/19/2020    Influenza, injectable, quadrivalent 10/03/2019    Influenza, seasonal, injectable 11/07/2014    Pfizer COVID-19 vaccine, Fall 2023, 12 years and older, (30mcg/0.3mL) 11/08/2023    Pfizer COVID-19 vaccine, bivalent, age 12 years and older (30 mcg/0.3 mL) 10/19/2022    Pfizer Gray Cap SARS-CoV-2 04/15/2022    Pfizer Purple Cap SARS-CoV-2 03/25/2021, " 04/22/2021, 10/26/2021    Pneumococcal polysaccharide vaccine, 23-valent, age 2 years and older (PNEUMOVAX 23) 02/07/2018    Tdap vaccine, age 7 year and older (BOOSTRIX, ADACEL) 06/12/2019, 08/28/2019, 09/11/2019     Patient's medical history was reviewed and updated either before or during this encounter.       Rajat Samano MD

## 2024-05-24 ENCOUNTER — OFFICE VISIT (OUTPATIENT)
Dept: PRIMARY CARE | Facility: CLINIC | Age: 54
End: 2024-05-24
Payer: COMMERCIAL

## 2024-05-24 VITALS
WEIGHT: 200 LBS | OXYGEN SATURATION: 97 % | TEMPERATURE: 98.1 F | BODY MASS INDEX: 25.67 KG/M2 | HEIGHT: 74 IN | SYSTOLIC BLOOD PRESSURE: 118 MMHG | HEART RATE: 84 BPM | DIASTOLIC BLOOD PRESSURE: 60 MMHG

## 2024-05-24 DIAGNOSIS — N52.9 ERECTILE DYSFUNCTION, UNSPECIFIED ERECTILE DYSFUNCTION TYPE: ICD-10-CM

## 2024-05-24 DIAGNOSIS — E55.9 VITAMIN D DEFICIENCY: ICD-10-CM

## 2024-05-24 DIAGNOSIS — Z01.89 ENCOUNTER FOR ROUTINE LABORATORY TESTING: ICD-10-CM

## 2024-05-24 DIAGNOSIS — Z12.5 ENCOUNTER FOR PROSTATE CANCER SCREENING: ICD-10-CM

## 2024-05-24 DIAGNOSIS — N20.0 NEPHROLITHIASIS: ICD-10-CM

## 2024-05-24 DIAGNOSIS — F41.9 ANXIETY DISORDER, UNSPECIFIED TYPE: ICD-10-CM

## 2024-05-24 DIAGNOSIS — E11.9 DIABETES MELLITUS WITHOUT COMPLICATION (MULTI): Primary | ICD-10-CM

## 2024-05-24 DIAGNOSIS — E78.5 DYSLIPIDEMIA: ICD-10-CM

## 2024-05-24 DIAGNOSIS — J32.9 SINUSITIS, UNSPECIFIED CHRONICITY, UNSPECIFIED LOCATION: ICD-10-CM

## 2024-05-24 LAB — POC HEMOGLOBIN A1C: 6.9 % (ref 4.2–6.5)

## 2024-05-24 PROCEDURE — 3078F DIAST BP <80 MM HG: CPT | Performed by: INTERNAL MEDICINE

## 2024-05-24 PROCEDURE — 83036 HEMOGLOBIN GLYCOSYLATED A1C: CPT | Mod: 91 | Performed by: INTERNAL MEDICINE

## 2024-05-24 PROCEDURE — 3074F SYST BP LT 130 MM HG: CPT | Performed by: INTERNAL MEDICINE

## 2024-05-24 PROCEDURE — 99214 OFFICE O/P EST MOD 30 MIN: CPT | Performed by: INTERNAL MEDICINE

## 2024-05-24 PROCEDURE — 1036F TOBACCO NON-USER: CPT | Performed by: INTERNAL MEDICINE

## 2024-05-24 RX ORDER — INSULIN DEGLUDEC 100 U/ML
26 INJECTION, SOLUTION SUBCUTANEOUS DAILY
Start: 2024-05-24

## 2024-05-24 RX ORDER — SERTRALINE HYDROCHLORIDE 100 MG/1
100 TABLET, FILM COATED ORAL DAILY
Qty: 90 TABLET | Refills: 3 | Status: SHIPPED | OUTPATIENT
Start: 2024-05-24 | End: 2025-05-24

## 2024-05-24 RX ORDER — AMOXICILLIN AND CLAVULANATE POTASSIUM 875; 125 MG/1; MG/1
1 TABLET, FILM COATED ORAL 2 TIMES DAILY
Qty: 20 TABLET | Refills: 0 | Status: SHIPPED | OUTPATIENT
Start: 2024-05-24 | End: 2024-06-03

## 2024-05-24 RX ORDER — METFORMIN HYDROCHLORIDE 1000 MG/1
1000 TABLET ORAL
Qty: 180 TABLET | Refills: 3 | Status: SHIPPED | OUTPATIENT
Start: 2024-05-24 | End: 2025-05-24

## 2024-05-24 ASSESSMENT — LIFESTYLE VARIABLES
HOW MANY STANDARD DRINKS CONTAINING ALCOHOL DO YOU HAVE ON A TYPICAL DAY: PATIENT DOES NOT DRINK
HOW OFTEN DURING THE LAST YEAR HAVE YOU NEEDED AN ALCOHOLIC DRINK FIRST THING IN THE MORNING TO GET YOURSELF GOING AFTER A NIGHT OF HEAVY DRINKING: NEVER
HOW OFTEN DURING THE LAST YEAR HAVE YOU BEEN UNABLE TO REMEMBER WHAT HAPPENED THE NIGHT BEFORE BECAUSE YOU HAD BEEN DRINKING: NEVER
AUDIT TOTAL SCORE: 0
HOW OFTEN DURING THE LAST YEAR HAVE YOU FAILED TO DO WHAT WAS NORMALLY EXPECTED FROM YOU BECAUSE OF DRINKING: NEVER
HOW OFTEN DO YOU HAVE A DRINK CONTAINING ALCOHOL: NEVER
AUDIT-C TOTAL SCORE: 0
HOW OFTEN DURING THE LAST YEAR HAVE YOU FOUND THAT YOU WERE NOT ABLE TO STOP DRINKING ONCE YOU HAD STARTED: NEVER
HOW OFTEN DURING THE LAST YEAR HAVE YOU HAD A FEELING OF GUILT OR REMORSE AFTER DRINKING: NEVER
HAVE YOU OR SOMEONE ELSE BEEN INJURED AS A RESULT OF YOUR DRINKING: NO
HAS A RELATIVE, FRIEND, DOCTOR, OR ANOTHER HEALTH PROFESSIONAL EXPRESSED CONCERN ABOUT YOUR DRINKING OR SUGGESTED YOU CUT DOWN: NO
HOW OFTEN DO YOU HAVE SIX OR MORE DRINKS ON ONE OCCASION: NEVER
SKIP TO QUESTIONS 9-10: 1

## 2024-05-24 ASSESSMENT — PAIN SCALES - GENERAL: PAINLEVEL: 0-NO PAIN

## 2024-05-24 ASSESSMENT — ENCOUNTER SYMPTOMS
OCCASIONAL FEELINGS OF UNSTEADINESS: 0
LOSS OF SENSATION IN FEET: 0
DEPRESSION: 0

## 2024-05-28 ENCOUNTER — APPOINTMENT (OUTPATIENT)
Dept: PRIMARY CARE | Facility: CLINIC | Age: 54
End: 2024-05-28
Payer: COMMERCIAL

## 2024-09-26 ASSESSMENT — ENCOUNTER SYMPTOMS
SHORTNESS OF BREATH: 0
FEVER: 0
ABDOMINAL PAIN: 0

## 2024-09-26 NOTE — PROGRESS NOTES
[facility]: [facility]  PROGRESS NOTE      Doug Peter is a 54 y.o. male that is presenting today for Follow-up.    Assessment/Plan   Diagnoses and all orders for this visit:  Diabetes mellitus without complication (Multi)  -     POCT glycosylated hemoglobin (Hb A1C) manually resulted  -     blood-glucose sensor (Dexcom G7 Sensor) device; Apply one sensor every 10 days.  Dyslipidemia  Anxiety disorder, unspecified type  Nephrolithiasis  Erectile dysfunction, unspecified erectile dysfunction type  Calculus of kidney  -     XR abdomen 1 view  Cervicalgia  -     Referral to Physical Therapy; Future  Other orders  -     Follow Up In Primary Care - Established; Future  -     Pneumococcal conjugate vaccine, 20-valent (PREVNAR 20)    Will get him back on the continuous glucose monitor with HI think is very effective, continue the metformin, Jardiance, Tresiba for now.  Lipids have been controlled on the atorvastatin, due for labs which are ordered.    Reviewed the screening and prevention schedule.    Subjective   HPI  54 y.o. male here for follow up.  Doing well but he has not been using his continuous glucose monitor and he knows that his sugar has been high.    Review of Systems   Constitutional:  Negative for fever.   Respiratory:  Negative for shortness of breath.    Cardiovascular:  Negative for chest pain.   Gastrointestinal:  Negative for abdominal pain.   All other systems reviewed and are negative.     Objective   Vitals:    09/27/24 1518   BP: 138/80   Pulse: 86   Temp: 36.5 °C (97.7 °F)   SpO2: 96%      Body mass index is 26.32 kg/m².  Physical Exam  Vitals reviewed.   Constitutional:       Appearance: Normal appearance.   Cardiovascular:      Rate and Rhythm: Normal rate and regular rhythm.      Heart sounds: No murmur heard.  Pulmonary:      Breath sounds: Normal breath sounds. No wheezing, rhonchi or rales.   Musculoskeletal:      Right lower leg: No edema.      Left lower leg: No  "edema.       Diagnostic Results   Lab Results   Component Value Date    GLUCOSE 187 (H) 06/30/2023    CALCIUM 9.1 06/30/2023     06/30/2023    K 3.9 06/30/2023    CO2 26 06/30/2023     06/30/2023    BUN 15 06/30/2023    CREATININE 0.9 06/30/2023     Lab Results   Component Value Date    ALT 27 06/30/2023    AST 23 06/30/2023    ALKPHOS 90 06/30/2023    BILITOT 0.3 06/30/2023     Lab Results   Component Value Date    WBC 4.8 06/30/2023    HGB 16.0 06/30/2023    HCT 49.9 06/30/2023    MCV 85.0 06/30/2023     06/30/2023     Lab Results   Component Value Date    CHOL 171 10/28/2023    CHOL 204 (H) 06/30/2023    CHOL 145 09/21/2021     Lab Results   Component Value Date    HDL 37.0 (L) 10/28/2023    HDL 35 (L) 06/30/2023    HDL 36 (L) 09/21/2021     Lab Results   Component Value Date    LDLCALC 76 10/28/2023    LDLCALC  06/30/2023     Unable to report calculated LDL due to increased triglycerides. Direct    LDLCALC 70 09/21/2021     Lab Results   Component Value Date    TRIG 289 (H) 10/28/2023    TRIG 500 (H) 06/30/2023    TRIG 197 (H) 09/21/2021     No components found for: \"CHOLHDL\"  Lab Results   Component Value Date    HGBA1C 8.5 (A) 09/27/2024     Other labs not included in the list above were reviewed either before or during this encounter.    History    History reviewed. No pertinent past medical history.  Past Surgical History:   Procedure Laterality Date    LITHOTRIPSY  08/08/2013    Renal Lithotripsy    US GUIDED PERCUTANEOUS PLACEMENT  2/25/2021    US GUIDED PERCUTANEOUS PLACEMENT [facility]     Family History   Problem Relation Name Age of Onset    Hypertension Mother      Prostate cancer Father      Diabetes Father       Social History     Socioeconomic History    Marital status:      Spouse name: Not on file    Number of children: Not on file    Years of education: Not on file    Highest education level: Not on file   Occupational History    Not on file   Tobacco Use    " "Smoking status: Never    Smokeless tobacco: Never   Substance and Sexual Activity    Alcohol use: Never    Drug use: Never    Sexual activity: Defer   Other Topics Concern    Not on file   Social History Narrative    Not on file     Social Determinants of Health     Financial Resource Strain: Not on file   Food Insecurity: Not on file   Transportation Needs: Not on file   Physical Activity: Not on file   Stress: Not on file   Social Connections: Not on file   Intimate Partner Violence: Not on file   Housing Stability: Not on file     Allergies   Allergen Reactions    Glimepiride Other     Irritability    Glipizide Other     Irritability    Linagliptin Other     Irritability    Niacin Other     Head tingles    Pioglitazone Diarrhea and Other     Myalgia     Current Outpatient Medications on File Prior to Visit   Medication Sig Dispense Refill    atorvastatin (Lipitor) 40 mg tablet TAKE 1 TABLET BY MOUTH EVERY DAY 90 tablet 3    blood sugar diagnostic strip twice a day.      empagliflozin (Jardiance) 25 mg Take 1 tablet (25 mg) by mouth once daily. 90 tablet 3    fluticasone (Flonase) 50 mcg/actuation nasal spray Administer 2 sprays into each nostril once daily. 16 g 11    folic acid/multivit-min/lutein (CENTRUM SILVER ORAL) Take by mouth.      insulin degludec (Tresiba FlexTouch U-100) 100 unit/mL (3 mL) injection Inject 26 Units under the skin once daily.      metFORMIN (Glucophage) 1,000 mg tablet Take 1 tablet (1,000 mg) by mouth 2 times daily (morning and late afternoon). 180 tablet 3    methylcellulose, laxative, (CitruceL) 500 mg tablet Take 1 tablet (500 mg) by mouth once daily.      pen needle, diabetic 32 gauge x 5/32\" needle Inject under the skin once daily.      sertraline (Zoloft) 100 mg tablet Take 1 tablet (100 mg) by mouth once daily. 90 tablet 3     No current facility-administered medications on file prior to visit.     Immunization History   Administered Date(s) Administered    Flu vaccine (IIV4), " preservative free *Check age/dose* 10/05/2021, 10/19/2023    Flu vaccine, quadrivalent, no egg protein, age 6 month or greater (FLUCELVAX) 10/05/2022    Hepatitis B vaccine, adult *Check Product/Dose* 09/10/2019, 10/16/2019, 06/19/2020    Influenza, injectable, quadrivalent 10/03/2019    Influenza, seasonal, injectable 11/07/2014    Pfizer COVID-19 vaccine, 12 years and older, (30mcg/0.3mL) (Comirnaty) 11/08/2023    Pfizer COVID-19 vaccine, bivalent, age 12 years and older (30 mcg/0.3 mL) 10/19/2022    Pfizer Gray Cap SARS-CoV-2 04/15/2022    Pfizer Purple Cap SARS-CoV-2 03/25/2021, 04/22/2021, 10/26/2021    Pneumococcal conjugate vaccine, 20-valent (PREVNAR 20) 09/27/2024    Pneumococcal polysaccharide vaccine, 23-valent, age 2 years and older (PNEUMOVAX 23) 02/07/2018    Tdap vaccine, age 7 year and older (BOOSTRIX, ADACEL) 06/12/2019, 08/28/2019, 09/11/2019     Patient's medical history was reviewed and updated either before or during this encounter.       Rajat Samano MD

## 2024-09-27 ENCOUNTER — HOSPITAL ENCOUNTER (OUTPATIENT)
Dept: RADIOLOGY | Facility: CLINIC | Age: 54
Discharge: HOME | End: 2024-09-27
Payer: COMMERCIAL

## 2024-09-27 ENCOUNTER — OFFICE VISIT (OUTPATIENT)
Dept: PRIMARY CARE | Facility: CLINIC | Age: 54
End: 2024-09-27
Payer: COMMERCIAL

## 2024-09-27 VITALS
HEIGHT: 74 IN | DIASTOLIC BLOOD PRESSURE: 80 MMHG | BODY MASS INDEX: 26.31 KG/M2 | HEART RATE: 86 BPM | WEIGHT: 205 LBS | OXYGEN SATURATION: 96 % | TEMPERATURE: 97.7 F | SYSTOLIC BLOOD PRESSURE: 138 MMHG

## 2024-09-27 DIAGNOSIS — N20.0 CALCULUS OF KIDNEY: ICD-10-CM

## 2024-09-27 DIAGNOSIS — N20.0 NEPHROLITHIASIS: ICD-10-CM

## 2024-09-27 DIAGNOSIS — E78.5 DYSLIPIDEMIA: ICD-10-CM

## 2024-09-27 DIAGNOSIS — E11.9 DIABETES MELLITUS WITHOUT COMPLICATION (MULTI): Primary | ICD-10-CM

## 2024-09-27 DIAGNOSIS — M54.2 CERVICALGIA: ICD-10-CM

## 2024-09-27 DIAGNOSIS — N52.9 ERECTILE DYSFUNCTION, UNSPECIFIED ERECTILE DYSFUNCTION TYPE: ICD-10-CM

## 2024-09-27 DIAGNOSIS — F41.9 ANXIETY DISORDER, UNSPECIFIED TYPE: ICD-10-CM

## 2024-09-27 LAB — POC HEMOGLOBIN A1C: 8.5 % (ref 4.2–6.5)

## 2024-09-27 PROCEDURE — 83036 HEMOGLOBIN GLYCOSYLATED A1C: CPT | Performed by: INTERNAL MEDICINE

## 2024-09-27 PROCEDURE — 72050 X-RAY EXAM NECK SPINE 4/5VWS: CPT

## 2024-09-27 RX ORDER — BLOOD-GLUCOSE SENSOR
EACH MISCELLANEOUS
Qty: 3 EACH | Refills: 11 | Status: SHIPPED | OUTPATIENT
Start: 2024-09-27

## 2024-09-27 ASSESSMENT — LIFESTYLE VARIABLES
HOW OFTEN DO YOU HAVE SIX OR MORE DRINKS ON ONE OCCASION: NEVER
HOW OFTEN DURING THE LAST YEAR HAVE YOU BEEN UNABLE TO REMEMBER WHAT HAPPENED THE NIGHT BEFORE BECAUSE YOU HAD BEEN DRINKING: NEVER
HOW OFTEN DO YOU HAVE A DRINK CONTAINING ALCOHOL: NEVER
HOW OFTEN DURING THE LAST YEAR HAVE YOU HAD A FEELING OF GUILT OR REMORSE AFTER DRINKING: NEVER
HOW OFTEN DURING THE LAST YEAR HAVE YOU FAILED TO DO WHAT WAS NORMALLY EXPECTED FROM YOU BECAUSE OF DRINKING: NEVER
HOW OFTEN DURING THE LAST YEAR HAVE YOU FOUND THAT YOU WERE NOT ABLE TO STOP DRINKING ONCE YOU HAD STARTED: NEVER
AUDIT TOTAL SCORE: 0
AUDIT-C TOTAL SCORE: 0
HAVE YOU OR SOMEONE ELSE BEEN INJURED AS A RESULT OF YOUR DRINKING: NO
SKIP TO QUESTIONS 9-10: 1
HOW MANY STANDARD DRINKS CONTAINING ALCOHOL DO YOU HAVE ON A TYPICAL DAY: PATIENT DOES NOT DRINK
HOW OFTEN DURING THE LAST YEAR HAVE YOU NEEDED AN ALCOHOLIC DRINK FIRST THING IN THE MORNING TO GET YOURSELF GOING AFTER A NIGHT OF HEAVY DRINKING: NEVER
HAS A RELATIVE, FRIEND, DOCTOR, OR ANOTHER HEALTH PROFESSIONAL EXPRESSED CONCERN ABOUT YOUR DRINKING OR SUGGESTED YOU CUT DOWN: NO

## 2024-09-27 ASSESSMENT — PATIENT HEALTH QUESTIONNAIRE - PHQ9
1. LITTLE INTEREST OR PLEASURE IN DOING THINGS: NOT AT ALL
SUM OF ALL RESPONSES TO PHQ9 QUESTIONS 1 AND 2: 0
2. FEELING DOWN, DEPRESSED OR HOPELESS: NOT AT ALL

## 2024-09-27 ASSESSMENT — PAIN SCALES - GENERAL: PAINLEVEL: 4

## 2024-09-27 ASSESSMENT — ENCOUNTER SYMPTOMS
DEPRESSION: 0
OCCASIONAL FEELINGS OF UNSTEADINESS: 0
LOSS OF SENSATION IN FEET: 0

## 2024-10-28 ENCOUNTER — EVALUATION (OUTPATIENT)
Dept: PHYSICAL THERAPY | Facility: CLINIC | Age: 54
End: 2024-10-28
Payer: COMMERCIAL

## 2024-10-28 DIAGNOSIS — M54.2 CERVICALGIA: ICD-10-CM

## 2024-10-28 PROCEDURE — 97110 THERAPEUTIC EXERCISES: CPT | Mod: GP

## 2024-10-28 PROCEDURE — 97161 PT EVAL LOW COMPLEX 20 MIN: CPT | Mod: GP

## 2024-10-28 ASSESSMENT — PATIENT HEALTH QUESTIONNAIRE - PHQ9
2. FEELING DOWN, DEPRESSED OR HOPELESS: NOT AT ALL
10. IF YOU CHECKED OFF ANY PROBLEMS, HOW DIFFICULT HAVE THESE PROBLEMS MADE IT FOR YOU TO DO YOUR WORK, TAKE CARE OF THINGS AT HOME, OR GET ALONG WITH OTHER PEOPLE: SOMEWHAT DIFFICULT
1. LITTLE INTEREST OR PLEASURE IN DOING THINGS: SEVERAL DAYS
SUM OF ALL RESPONSES TO PHQ9 QUESTIONS 1 AND 2: 1

## 2024-10-28 ASSESSMENT — PAIN - FUNCTIONAL ASSESSMENT: PAIN_FUNCTIONAL_ASSESSMENT: 0-10

## 2024-10-28 ASSESSMENT — PAIN SCALES - GENERAL: PAINLEVEL_OUTOF10: 6

## 2024-11-13 ENCOUNTER — TREATMENT (OUTPATIENT)
Dept: PHYSICAL THERAPY | Facility: CLINIC | Age: 54
End: 2024-11-13
Payer: COMMERCIAL

## 2024-11-13 DIAGNOSIS — M54.2 CERVICALGIA: ICD-10-CM

## 2024-11-13 PROCEDURE — 97140 MANUAL THERAPY 1/> REGIONS: CPT | Mod: GP,CQ

## 2024-11-13 ASSESSMENT — PAIN SCALES - GENERAL: PAINLEVEL_OUTOF10: 4

## 2024-11-13 ASSESSMENT — PAIN - FUNCTIONAL ASSESSMENT: PAIN_FUNCTIONAL_ASSESSMENT: 0-10

## 2024-11-13 NOTE — PROGRESS NOTES
"    Physical Therapy Treatment    Patient Name: Doug Peter  MRN: [id number]  Encounter date:  11/13/2024  Time Calculation  Start Time: 0810  Stop Time: 0851  Time Calculation (min): 41 min     PT Therapeutic Procedures Time Entry  Manual Therapy Time Entry: 30    Visit Number:  2 (including evaluation)  Planned total visits: 12  Visit Authorized/insurance coverage:  10/24/24: NO AUTH, 40.00 CO PAY, 100% COVERAGE, 60V PT/OT/ST, 3250 OOP- NOT MET, ANTHEM      Plan for next visit: assess response to HEP, manual techniques to address pain and spasm, postural exercise to tolerance     The patient is interested in dry needling.       Current Problem  Problem List Items Addressed This Visit             ICD-10-CM    Cervicalgia M54.2      Precautions  Precautions  Precautions Comment: None    Pain  Pain Assessment: 0-10  0-10 (Numeric) Pain Score: 4  Pain Type: Chronic pain  Pain Location: Neck  Pain Radiating Towards: Radiates into trap    Subjective  General   \"The pain is very high in the occipital at night.\"   \"I am trying to get into the habit of doing my exercises.\"       Objective  Trigger points   distal cervical para spinals bilateral  Upper trap mid bilateral        Treatment:  Manual:    Supine with wedge   Occipital releases   Cervical rotation  Cervical lateral flexion  STM occipital  MFR cervical para spinals, upper traps    Seated   STM with deep prep cervical para spinals, upper traps      Current HEP:  HEP issued, demonstrated, instructed pt in exercises, exercises include:  Access Code: [email or web address]  URL: [email or web address]  Date: 10/28/2024  Prepared by: Stephen Myers     Exercises  - Supine Shoulder Flexion Extension AAROM with Dowel  - 1 x daily - 7 x weekly - 2 sets - 10 reps  - Seated Scalene Stretch with Towel  - 1 x daily - 7 x weekly - 1 sets - 3 reps - 20 hold  - Seated Scapular Retraction  - 1 x daily - 7 x weekly - 2 sets - 10 reps - 3-5 seond hold  - Seated Cervical Retraction  - 1 x " "daily - 7 x weekly - 2 sets - 10 reps - 3-5 seconds hold  - Corner Pec Minor Stretch  - 1 x daily - 7 x weekly - 1 sets - 3 reps - 30 hold      OP EDUCATION:  Desk ergonomics        Assessment:   Focus on pain control this date.   Areas of improvements:  Decreased pain  Limitations/deficits:  Weakness, Pain limits work, and ROM limited and affects function    Pain end of session:  2-3/10 \"Looser\"     Plan:     Continue with current POC/no changes    Assessment of current progress against goals:  Progressing toward functional goals    Goals:  Active       PT Problem       STG, 6 visits       Start:  10/28/24    Expected End:  12/12/24       Pt will be independent in HEP to improve posture, mobility, and function.  Pt will report 50% reduction in pain with working at desk.   Pt will demonstrate appropriate sitting and standing posture at the beginning and throughout a session with minimal cues for decreased cervical strain during functional tasks.          Pt will demonstrate full cervical AROM in all planes without pain for improved cervical mobility during ADLs.        Start:  10/28/24    Expected End:  01/26/25            Pt will increase strength in BUEs by 1/2 MMT in all planes for improved performance of functional activities.        Start:  10/28/24    Expected End:  01/26/25            Pt will perform medium weight lifting tasks with appropriate posture and without pain for improved performance of household tasks.        Start:  10/28/24    Expected End:  01/26/25            Pt will sit and work at desk for up to 2 hours with appropriate ergonomic setup and without limitation by pain.         Start:  10/28/24    Expected End:  01/26/25            Pt will demonstrate appropriate sitting, standing, and dynamic posture at the beginning and throughout a session without cues for decreased cervical strain during functional tasks.         Start:  10/28/24    Expected End:  01/26/25                         "

## 2024-11-15 ENCOUNTER — TREATMENT (OUTPATIENT)
Dept: PHYSICAL THERAPY | Facility: CLINIC | Age: 54
End: 2024-11-15
Payer: COMMERCIAL

## 2024-11-15 DIAGNOSIS — M54.2 CERVICALGIA: ICD-10-CM

## 2024-11-15 PROCEDURE — 97140 MANUAL THERAPY 1/> REGIONS: CPT | Mod: GP,CQ

## 2024-11-15 ASSESSMENT — PAIN SCALES - GENERAL: PAINLEVEL_OUTOF10: 4

## 2024-11-15 ASSESSMENT — PAIN - FUNCTIONAL ASSESSMENT: PAIN_FUNCTIONAL_ASSESSMENT: 0-10

## 2024-11-15 NOTE — PROGRESS NOTES
"    Physical Therapy Treatment    Patient Name: Doug Peter  MRN: [id number]  Encounter date:  11/15/2024  Time Calculation  Start Time: 0815  Stop Time: 0846  Time Calculation (min): 31 min     PT Therapeutic Procedures Time Entry  Manual Therapy Time Entry: 27    Visit Number:  3 (including evaluation)  Planned total visits: 12  Visit Authorized/insurance coverage:  10/24/24: NO AUTH, 40.00 CO PAY, 100% COVERAGE, 60V PT/OT/ST, 3250 OOP- NOT MET, ANTHEM      Plan for next visit: manual techniques to address pain and spasm, postural exercise to tolerance     The patient is interested in dry needling.       Current Problem  Problem List Items Addressed This Visit             ICD-10-CM    Cervicalgia M54.2     Precautions  Precautions  Precautions Comment: None    Pain  Pain Assessment: 0-10  0-10 (Numeric) Pain Score: 4  Pain Type: Chronic pain  Pain Location: Neck  Pain Radiating Towards: radiates into the trap    Subjective  General   \"A little relief and then yesterday a little worse in the afternoon.\"   \"I went on my indoor bike and I think the position aggravated it.\"      Objective  Trigger points distal cervical para spinal   TTP upper traps     Treatment:  Manual:    Supine with wedge   Occipital releases   Cervical rotation  Cervical lateral flexion  STM occipital  MFR cervical para spinals, upper traps    Seated   STM with deep prep cervical para spinals, upper traps and posterior musculature.     Current HEP:  HEP issued, demonstrated, instructed pt in exercises, exercises include:  Access Code: [email or web address]  URL: [email or web address]  Date: 10/28/2024  Prepared by: Stephen Myers     Exercises  - Supine Shoulder Flexion Extension AAROM with Dowel  - 1 x daily - 7 x weekly - 2 sets - 10 reps  - Seated Scalene Stretch with Towel  - 1 x daily - 7 x weekly - 1 sets - 3 reps - 20 hold  - Seated Scapular Retraction  - 1 x daily - 7 x weekly - 2 sets - 10 reps - 3-5 seond hold  - Seated Cervical Retraction  " "- 1 x daily - 7 x weekly - 2 sets - 10 reps - 3-5 seconds hold  - Corner Pec Minor Stretch  - 1 x daily - 7 x weekly - 1 sets - 3 reps - 30 hold      OP EDUCATION:  Defer use of spin bike due to position        Assessment:   Continued to focus on manual to address pain and myofacial restrictions.   Pt's response to treatment: Good   Areas of improvements:  Decreased pain, ease of movement  Limitations/deficits:   Pain with ADL's     Pain end of session:  3/10 \"Looser.\"     Plan:     Continue with current POC/no changes    Assessment of current progress against goals:  Symptomatic relief with treatment     Goals:  Active       PT Problem       STG, 6 visits       Start:  10/28/24    Expected End:  12/12/24       Pt will be independent in HEP to improve posture, mobility, and function.  Pt will report 50% reduction in pain with working at desk.   Pt will demonstrate appropriate sitting and standing posture at the beginning and throughout a session with minimal cues for decreased cervical strain during functional tasks.          Pt will demonstrate full cervical AROM in all planes without pain for improved cervical mobility during ADLs.        Start:  10/28/24    Expected End:  01/26/25            Pt will increase strength in BUEs by 1/2 MMT in all planes for improved performance of functional activities.        Start:  10/28/24    Expected End:  01/26/25            Pt will perform medium weight lifting tasks with appropriate posture and without pain for improved performance of household tasks.        Start:  10/28/24    Expected End:  01/26/25            Pt will sit and work at desk for up to 2 hours with appropriate ergonomic setup and without limitation by pain.         Start:  10/28/24    Expected End:  01/26/25            Pt will demonstrate appropriate sitting, standing, and dynamic posture at the beginning and throughout a session without cues for decreased cervical strain during functional tasks.         Start: "  10/28/24    Expected End:  01/26/25

## 2024-11-17 DIAGNOSIS — E11.9 DIABETES MELLITUS WITHOUT COMPLICATION (MULTI): ICD-10-CM

## 2024-11-18 ENCOUNTER — APPOINTMENT (OUTPATIENT)
Dept: PHYSICAL THERAPY | Facility: CLINIC | Age: 54
End: 2024-11-18
Payer: COMMERCIAL

## 2024-11-18 RX ORDER — INSULIN DEGLUDEC 100 U/ML
40 INJECTION, SOLUTION SUBCUTANEOUS DAILY
Qty: 15 ML | Refills: 11 | Status: SHIPPED | OUTPATIENT
Start: 2024-11-18

## 2024-11-21 NOTE — PROGRESS NOTES
Physical Therapy Treatment    Patient Name: Doug Peter  MRN: [id number]  Encounter date:  11/22/2024  Time Calculation  Start Time: 0815  Stop Time: 0855  Time Calculation (min): 40 min     PT Therapeutic Procedures Time Entry  Manual Therapy Time Entry: 40    Visit Number:  4 (including evaluation)  Planned total visits: 12 visits  Visits Authorized/Insurance Coverage:  NO AUTH, 40.00 CO PAY, 100% COVERAGE, 60V PT/OT/ST, 3250 OO     Current Problem  Problem List Items Addressed This Visit             ICD-10-CM    Cervicalgia M54.2       Precautions  Precautions  Precautions Comment: None    Pain  Pain Assessment: 0-10  0-10 (Numeric) Pain Score: 5 - Moderate pain    Subjective  Pain seems to be better overall, is not constant like it was. Mornings, desk work, and lifting continue to cause symptoms.     Objective  Trigger points B upper trap, hypomobile B first rib    Treatment:  Manual Therapy  Manual Therapy Performed: Yes  Supine:  Manual cervical distraction  Suboccipital release  TpR B upper trap  First rib mobs B grade III  Median nerve glides RUE    Dry needling: Pt educated in benefits, risks, contraindications, and potential adverse reactions to needling. Consent provided to proceed with needling. Area mapped and important landmarks marked for safety, as well as palpation of tender and painful points in tissue. Pt positioned in supine,     2 0.3x30mm needles inserted into B upper trap, needles pistoned and removed quickly    Needles removed, pt showed no signs of adverse reaction, universal precautions maintained.     Current HEP:  Access Code: [email or web address]  URL: [email or web address]  Date: 10/28/2024  Prepared by: Stephen Myers    Exercises  - Supine Shoulder Flexion Extension AAROM with Dowel  - 1 x daily - 7 x weekly - 2 sets - 10 reps  - Seated Scalene Stretch with Towel  - 1 x daily - 7 x weekly - 1 sets - 3 reps - 20 hold  - Seated Scapular Retraction  - 1 x daily - 7 x weekly - 2 sets - 10  reps - 3-5 seond hold  - Seated Cervical Retraction  - 1 x daily - 7 x weekly - 2 sets - 10 reps - 3-5 seconds hold  - Corner Pec Minor Stretch  - 1 x daily - 7 x weekly - 1 sets - 3 reps - 30 hold    Assessment:  Pt's response to treatment:  Dry needling and other above techniques used to promote mobility and decrease pain in B upper trap. Pt had some soreness during but relief following. Pt to monitor pain over the next few days to determine benefits of needling.   Areas of improvements:  symptom relief  Limitations/deficits:  continued muscle spasm    Pain end of session: 3/10    Plan:  OP PT Plan  Treatment/Interventions: Dry needling, Electrical stimulation, Manual therapy, Mechanical traction, Neuromuscular re-education, Taping techniques, Ultrasound, Therapeutic activities, Therapeutic exercises  PT Plan: Skilled PT  PT Frequency: 2 times per week  Duration: 12 visits  Certification Period Start Date: 10/28/24  Certification Period End Date: 01/26/25  Number of Treatments Authorized: 60/yr  Rehab Potential: Good  Plan of Care Agreement: Patient  Continue with current POC/no changes    Assessment of current progress against goals:  Progressing toward functional goals    Goals:  Active       PT Problem       STG, 6 visits       Start:  10/28/24    Expected End:  12/12/24       Pt will be independent in HEP to improve posture, mobility, and function.  Pt will report 50% reduction in pain with working at desk.   Pt will demonstrate appropriate sitting and standing posture at the beginning and throughout a session with minimal cues for decreased cervical strain during functional tasks.          Pt will demonstrate full cervical AROM in all planes without pain for improved cervical mobility during ADLs.        Start:  10/28/24    Expected End:  01/26/25            Pt will increase strength in BUEs by 1/2 MMT in all planes for improved performance of functional activities.        Start:  10/28/24    Expected End:   01/26/25            Pt will perform medium weight lifting tasks with appropriate posture and without pain for improved performance of household tasks.        Start:  10/28/24    Expected End:  01/26/25            Pt will sit and work at desk for up to 2 hours with appropriate ergonomic setup and without limitation by pain.         Start:  10/28/24    Expected End:  01/26/25            Pt will demonstrate appropriate sitting, standing, and dynamic posture at the beginning and throughout a session without cues for decreased cervical strain during functional tasks.         Start:  10/28/24    Expected End:  01/26/25

## 2024-11-22 ENCOUNTER — TREATMENT (OUTPATIENT)
Dept: PHYSICAL THERAPY | Facility: CLINIC | Age: 54
End: 2024-11-22
Payer: COMMERCIAL

## 2024-11-22 DIAGNOSIS — M54.2 CERVICALGIA: ICD-10-CM

## 2024-11-22 PROCEDURE — 97140 MANUAL THERAPY 1/> REGIONS: CPT | Mod: GP

## 2024-11-22 ASSESSMENT — PAIN SCALES - GENERAL: PAINLEVEL_OUTOF10: 5 - MODERATE PAIN

## 2024-11-22 ASSESSMENT — PAIN - FUNCTIONAL ASSESSMENT: PAIN_FUNCTIONAL_ASSESSMENT: 0-10

## 2024-11-25 ENCOUNTER — APPOINTMENT (OUTPATIENT)
Dept: PHYSICAL THERAPY | Facility: CLINIC | Age: 54
End: 2024-11-25
Payer: COMMERCIAL

## 2024-12-02 ENCOUNTER — TREATMENT (OUTPATIENT)
Dept: PHYSICAL THERAPY | Facility: CLINIC | Age: 54
End: 2024-12-02
Payer: COMMERCIAL

## 2024-12-02 DIAGNOSIS — M54.2 CERVICALGIA: ICD-10-CM

## 2024-12-02 PROCEDURE — 97140 MANUAL THERAPY 1/> REGIONS: CPT | Mod: GP

## 2024-12-02 ASSESSMENT — PAIN - FUNCTIONAL ASSESSMENT: PAIN_FUNCTIONAL_ASSESSMENT: 0-10

## 2024-12-02 ASSESSMENT — PAIN SCALES - GENERAL: PAINLEVEL_OUTOF10: 3

## 2024-12-02 NOTE — PROGRESS NOTES
Physical Therapy Treatment    Patient Name: Doug Peter  MRN: [id number]  Encounter date:  12/2/2024  Time Calculation  Start Time: 1445  Stop Time: 1527  Time Calculation (min): 42 min     PT Therapeutic Procedures Time Entry  Manual Therapy Time Entry: 42    Visit Number:  5 (including evaluation)  Planned total visits: 12 visits  Visits Authorized/Insurance Coverage:  NO AUTH, 40.00 CO PAY, 100% COVERAGE, 60V PT/OT/ST, 3250 OO     Current Problem  Problem List Items Addressed This Visit             ICD-10-CM    Cervicalgia M54.2         Precautions  Precautions  Precautions Comment: None    Pain  Pain Assessment: 0-10  0-10 (Numeric) Pain Score: 3    Subjective  Pt has been off work the past few days and notices significantly less neck pain from not having to sit at his desk. 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.3    Objective  Tightness distal R scalenes insertion    Treatment:  Manual Therapy  Manual Therapy Performed: Yes  Supine:  Manual cervical distraction  Suboccipital release  TpR B upper trap  First rib mobs B grade III  Median nerve glides RUE    Current HEP:  Access Code: [email or web address]  URL: [email or web address]  Date: 10/28/2024  Prepared by: Stephen Myers    Exercises  - Supine Shoulder Flexion Extension AAROM with Dowel  - 1 x daily - 7 x weekly - 2 sets - 10 reps  - Seated Scalene Stretch with Towel  - 1 x daily - 7 x weekly - 1 sets - 3 reps - 20 hold  - Seated Scapular Retraction  - 1 x daily - 7 x weekly - 2 sets - 10 reps - 3-5 seond hold  - Seated Cervical Retraction  - 1 x daily - 7 x weekly - 2 sets - 10 reps - 3-5 seconds hold  - Corner Pec Minor Stretch  - 1 x daily - 7 x weekly - 1 sets - 3 reps - 30 hold    Assessment:  Pt's response to treatment:  Manual techniques continued to address pain and stiffness. Pt with good relief within session but did still have some tightness in scalenes. Plan to progress exercise moving forward to improve  posture and tightness.   Areas of improvements: symptom relief with treatment   Limitations/deficits: spasm with activity     Pain end of session: 0/10    Plan:  OP PT Plan  Treatment/Interventions: Dry needling, Electrical stimulation, Manual therapy, Mechanical traction, Neuromuscular re-education, Taping techniques, Ultrasound, Therapeutic activities, Therapeutic exercises  PT Plan: Skilled PT  PT Frequency: 2 times per week  Duration: 12 visits  Certification Period Start Date: 10/28/24  Certification Period End Date: 01/26/25  Number of Treatments Authorized: 60/yr  Rehab Potential: Good  Plan of Care Agreement: Patient  Continue with current POC/no changes    Assessment of current progress against goals:  Progressing toward functional goals    Goals:  Active       PT Problem       STG, 6 visits       Start:  10/28/24    Expected End:  12/12/24       Pt will be independent in HEP to improve posture, mobility, and function.  Pt will report 50% reduction in pain with working at desk.   Pt will demonstrate appropriate sitting and standing posture at the beginning and throughout a session with minimal cues for decreased cervical strain during functional tasks.          Pt will demonstrate full cervical AROM in all planes without pain for improved cervical mobility during ADLs.        Start:  10/28/24    Expected End:  01/26/25            Pt will increase strength in BUEs by 1/2 MMT in all planes for improved performance of functional activities.        Start:  10/28/24    Expected End:  01/26/25            Pt will perform medium weight lifting tasks with appropriate posture and without pain for improved performance of household tasks.        Start:  10/28/24    Expected End:  01/26/25            Pt will sit and work at desk for up to 2 hours with appropriate ergonomic setup and without limitation by pain.         Start:  10/28/24    Expected End:  01/26/25            Pt will demonstrate appropriate sitting,  standing, and dynamic posture at the beginning and throughout a session without cues for decreased cervical strain during functional tasks.         Start:  10/28/24    Expected End:  01/26/25

## 2024-12-03 DIAGNOSIS — E78.5 HYPERLIPIDEMIA, UNSPECIFIED: ICD-10-CM

## 2024-12-03 RX ORDER — ATORVASTATIN CALCIUM 40 MG/1
40 TABLET, FILM COATED ORAL DAILY
Qty: 90 TABLET | Refills: 3 | Status: SHIPPED | OUTPATIENT
Start: 2024-12-03

## 2024-12-05 NOTE — PROGRESS NOTES
"    Physical Therapy Treatment    Patient Name: Doug Peter  MRN: [id number]  Encounter date:  12/6/2024  Time Calculation  Start Time: 0815  Stop Time: 0859  Time Calculation (min): 44 min     PT Therapeutic Procedures Time Entry  Manual Therapy Time Entry: 19  Therapeutic Exercise Time Entry: 25    Visit Number:  6 (including evaluation)  Planned total visits: 12 visits  Visits Authorized/Insurance Coverage:  NO AUTH, 40.00 CO PAY, 100% COVERAGE, 60V PT/OT/ST, 3250 OO     Current Problem  Problem List Items Addressed This Visit             ICD-10-CM    Cervicalgia M54.2       Precautions  Precautions  Precautions Comment: None    Pain  Pain Assessment: 0-10  0-10 (Numeric) Pain Score: 3    Subjective  Pt reports pain is still low level, tightness is at its worst around R clavicle. Activity tolerance continues to improve.     Objective  Min cues for posture with band exercises    Treatment:  Therapeutic Exercise  Therapeutic Exercise Performed: Yes  UBE L1 x90\" fwd/bwd    Flexion wall slides x15  Self first rib mobs with strap x10   Band horizontal abduction x10 7#  Band B ER x10 7#  Pushup+ to wall x10  Band row x10 10#  Band extension x10 10#    Manual Therapy  Manual Therapy Performed: Yes  Supine:  Manual cervical distraction  Suboccipital release  First rib mobs B grade III      Current HEP:  Access Code: [email or web address]  URL: [email or web address]  Date: 10/28/2024  Prepared by: Stephen Myers    Exercises  - Supine Shoulder Flexion Extension AAROM with Dowel  - 1 x daily - 7 x weekly - 2 sets - 10 reps  - Seated Scalene Stretch with Towel  - 1 x daily - 7 x weekly - 1 sets - 3 reps - 20 hold  - Seated Scapular Retraction  - 1 x daily - 7 x weekly - 2 sets - 10 reps - 3-5 seond hold  - Seated Cervical Retraction  - 1 x daily - 7 x weekly - 2 sets - 10 reps - 3-5 seconds hold  - Corner Pec Minor Stretch  - 1 x daily - 7 x weekly - 1 sets - 3 reps - 30 hold    Access Code: [email or web address]  URL: " [email or web address]  Date: 12/06/2024  Prepared by: Stephen Myers    Exercises  - First Rib Mobilization with Strap  - 1 x daily - 7 x weekly - 2 sets - 10 reps  - Shoulder External Rotation and Scapular Retraction with Resistance  - 1 x daily - 7 x weekly - 2 sets - 10 reps  - Standing Shoulder Horizontal Abduction with Resistance  - 1 x daily - 7 x weekly - 2 sets - 10 reps  - Wall Push Up with Plus  - 1 x daily - 7 x weekly - 2 sets - 10 reps    Assessment:  Pt's response to treatment:  Postural exercise progressed as above. Pt felt some tightness in anterior shoulder around scalenes but this improved with repetition. Pt with good decrease in symptoms overall at exit.   Areas of improvements: good response to exercise   Limitations/deficits: cues needed for postural awareness     Pain end of session: 1/10    Plan:  OP PT Plan  Treatment/Interventions: Dry needling, Electrical stimulation, Manual therapy, Mechanical traction, Neuromuscular re-education, Taping techniques, Ultrasound, Therapeutic activities, Therapeutic exercises  PT Plan: Skilled PT  PT Frequency: 2 times per week  Duration: 12 visits  Certification Period Start Date: 10/28/24  Certification Period End Date: 01/26/25  Number of Treatments Authorized: 60/yr  Rehab Potential: Good  Plan of Care Agreement: Patient  Continue with current POC/no changes    Assessment of current progress against goals:  Progressing toward functional goals    Goals:  Active       PT Problem       STG, 6 visits       Start:  10/28/24    Expected End:  12/12/24       Pt will be independent in HEP to improve posture, mobility, and function.  Pt will report 50% reduction in pain with working at desk.   Pt will demonstrate appropriate sitting and standing posture at the beginning and throughout a session with minimal cues for decreased cervical strain during functional tasks.          Pt will demonstrate full cervical AROM in all planes without pain for improved  cervical mobility during ADLs.        Start:  10/28/24    Expected End:  01/26/25            Pt will increase strength in BUEs by 1/2 MMT in all planes for improved performance of functional activities.        Start:  10/28/24    Expected End:  01/26/25            Pt will perform medium weight lifting tasks with appropriate posture and without pain for improved performance of household tasks.        Start:  10/28/24    Expected End:  01/26/25            Pt will sit and work at desk for up to 2 hours with appropriate ergonomic setup and without limitation by pain.         Start:  10/28/24    Expected End:  01/26/25            Pt will demonstrate appropriate sitting, standing, and dynamic posture at the beginning and throughout a session without cues for decreased cervical strain during functional tasks.         Start:  10/28/24    Expected End:  01/26/25

## 2024-12-06 ENCOUNTER — TREATMENT (OUTPATIENT)
Dept: PHYSICAL THERAPY | Facility: CLINIC | Age: 54
End: 2024-12-06
Payer: COMMERCIAL

## 2024-12-06 DIAGNOSIS — M54.2 CERVICALGIA: ICD-10-CM

## 2024-12-06 PROCEDURE — 97140 MANUAL THERAPY 1/> REGIONS: CPT | Mod: GP

## 2024-12-06 PROCEDURE — 97110 THERAPEUTIC EXERCISES: CPT | Mod: GP

## 2024-12-06 ASSESSMENT — PAIN SCALES - GENERAL: PAINLEVEL_OUTOF10: 3

## 2024-12-06 ASSESSMENT — PAIN - FUNCTIONAL ASSESSMENT: PAIN_FUNCTIONAL_ASSESSMENT: 0-10

## 2024-12-09 ASSESSMENT — ENCOUNTER SYMPTOMS
FEVER: 0
SHORTNESS OF BREATH: 0
ABDOMINAL PAIN: 0

## 2024-12-09 NOTE — PROGRESS NOTES
[facility]: [facility]  PROGRESS NOTE      Doug Peter is a 54 y.o. male that is presenting today for Diabetes.    Assessment/Plan   Diagnoses and all orders for this visit:  Annual physical exam  Diabetes mellitus without complication (Multi)  -     POCT glycosylated hemoglobin (Hb A1C) manually resulted  -     insulin degludec (Tresiba FlexTouch U-100) 100 unit/mL (3 mL) injection; Inject 30 Units under the skin once daily.  -     tirzepatide (Mounjaro) 2.5 mg/0.5 mL pen injector; Inject 2.5 mg under the skin 1 (one) time per week.  Dyslipidemia  Anxiety disorder, unspecified type  Erectile dysfunction, unspecified erectile dysfunction type  Vitamin D deficiency  Other orders  -     Follow Up In Primary Care - Established  -     Follow Up In Primary Care - Established; Future    Diabetes, continue the metformin, Tresiba at current dose and the Jardiance.  The fasting blood glucose levels are good he is having difficulty with postprandial excursions.  He had unacceptable GI side effects with the Ozempic.  Would like to try Mounjaro low-dose.    Lipids controlled on the atorvastatin.    Subjective   HPI  54 y.o. male here for follow up.  Wearing the CGM and the sugar has come down.  Still gets the postprandial highs.    Review of Systems   Constitutional:  Negative for fever.   Respiratory:  Negative for shortness of breath.    Cardiovascular:  Negative for chest pain.   Gastrointestinal:  Negative for abdominal pain.   All other systems reviewed and are negative.     Objective   Vitals:    12/10/24 0809   BP: 132/84   Pulse: 72   Temp: 36.1 °C (97 °F)   SpO2: 98%      Body mass index is 26.58 kg/m².  Physical Exam  Vitals reviewed.   Constitutional:       Appearance: Normal appearance.   Cardiovascular:      Rate and Rhythm: Normal rate and regular rhythm.      Heart sounds: No murmur heard.  Pulmonary:      Breath sounds: Normal breath sounds. No wheezing, rhonchi or rales.  "  Musculoskeletal:      Right lower leg: No edema.      Left lower leg: No edema.     Diagnostic Results   Lab Results   Component Value Date    GLUCOSE 187 (H) 06/30/2023    CALCIUM 9.1 06/30/2023     06/30/2023    K 3.9 06/30/2023    CO2 26 06/30/2023     06/30/2023    BUN 15 06/30/2023    CREATININE 0.9 06/30/2023     Lab Results   Component Value Date    ALT 27 06/30/2023    AST 23 06/30/2023    ALKPHOS 90 06/30/2023    BILITOT 0.3 06/30/2023     Lab Results   Component Value Date    WBC 4.8 06/30/2023    HGB 16.0 06/30/2023    HCT 49.9 06/30/2023    MCV 85.0 06/30/2023     06/30/2023     Lab Results   Component Value Date    CHOL 171 10/28/2023    CHOL 204 (H) 06/30/2023    CHOL 145 09/21/2021     Lab Results   Component Value Date    HDL 37.0 (L) 10/28/2023    HDL 35 (L) 06/30/2023    HDL 36 (L) 09/21/2021     Lab Results   Component Value Date    LDLCALC 76 10/28/2023    LDLCALC  06/30/2023     Unable to report calculated LDL due to increased triglycerides. Direct    LDLCALC 70 09/21/2021     Lab Results   Component Value Date    TRIG 289 (H) 10/28/2023    TRIG 500 (H) 06/30/2023    TRIG 197 (H) 09/21/2021     No components found for: \"CHOLHDL\"  Lab Results   Component Value Date    HGBA1C 7.8 (A) 12/10/2024     Other labs not included in the list above were reviewed either before or during this encounter.    History    History reviewed. No pertinent past medical history.  Past Surgical History:   Procedure Laterality Date   • LITHOTRIPSY  08/08/2013    Renal Lithotripsy   • US GUIDED PERCUTANEOUS PLACEMENT  2/25/2021    US GUIDED PERCUTANEOUS PLACEMENT [facility]     Family History   Problem Relation Name Age of Onset   • Hypertension Mother     • Prostate cancer Father     • Diabetes Father       Social History     Socioeconomic History   • Marital status:      Spouse name: Not on file   • Number of children: Not on file   • Years of education: Not on file   • Highest " "education level: Not on file   Occupational History   • Not on file   Tobacco Use   • Smoking status: Never   • Smokeless tobacco: Never   Substance and Sexual Activity   • Alcohol use: Never   • Drug use: Never   • Sexual activity: Defer   Other Topics Concern   • Not on file   Social History Narrative   • Not on file     Social Drivers of Health     Financial Resource Strain: Not on file   Food Insecurity: Not on file   Transportation Needs: Not on file   Physical Activity: Not on file   Stress: Not on file   Social Connections: Not on file   Intimate Partner Violence: Not on file   Housing Stability: Not on file     Allergies   Allergen Reactions   • Glimepiride Other     Irritability   • Glipizide Other     Irritability   • Linagliptin Other     Irritability   • Niacin Other     Head tingles   • Pioglitazone Diarrhea and Other     Myalgia     Current Outpatient Medications on File Prior to Visit   Medication Sig Dispense Refill   • atorvastatin (Lipitor) 40 mg tablet Take 1 tablet (40 mg) by mouth once daily. 90 tablet 3   • blood sugar diagnostic strip twice a day.     • blood-glucose sensor (Dexcom G7 Sensor) device Apply one sensor every 10 days. 3 each 11   • empagliflozin (Jardiance) 25 mg Take 1 tablet (25 mg) by mouth once daily. 90 tablet 3   • fluticasone (Flonase) 50 mcg/actuation nasal spray Administer 2 sprays into each nostril once daily. 16 g 11   • folic acid/multivit-min/lutein (CENTRUM SILVER ORAL) Take by mouth.     • metFORMIN (Glucophage) 1,000 mg tablet Take 1 tablet (1,000 mg) by mouth 2 times daily (morning and late afternoon). 180 tablet 3   • methylcellulose, laxative, (CitruceL) 500 mg tablet Take 1 tablet (500 mg) by mouth once daily.     • pen needle, diabetic 32 gauge x 5/32\" needle Inject under the skin once daily.     • sertraline (Zoloft) 100 mg tablet Take 1 tablet (100 mg) by mouth once daily. 90 tablet 3   • [DISCONTINUED] Tresiba FlexTouch U-100 100 unit/mL (3 mL) injection " INJECT 40 UNITS UNDER THE SKIN ONCE DAILY. 15 mL 11     No current facility-administered medications on file prior to visit.     Immunization History   Administered Date(s) Administered   • COVID-19, mRNA, LNP-S, PF, 30 mcg/0.3 mL dose 03/25/2021, 04/22/2021, 10/26/2021   • Flu vaccine (IIV4), preservative free *Check age/dose* 10/05/2021, 10/19/2023   • Flu vaccine, quadrivalent, no egg protein, age 6 month or greater (FLUCELVAX) 10/05/2022   • Flu vaccine, trivalent, preservative free, age 6 months and greater (Fluarix/Fluzone/Flulaval) 10/15/2024   • Hepatitis B vaccine, adult *Check Product/Dose* 09/10/2019, 10/16/2019, 06/19/2020   • Influenza, injectable, quadrivalent 10/03/2019   • Influenza, seasonal, injectable 11/07/2014   • Pfizer COVID-19 vaccine, 12 years and older, (30mcg/0.3mL) (Comirnaty) 11/08/2023   • Pfizer COVID-19 vaccine, bivalent, age 12 years and older (30 mcg/0.3 mL) 10/19/2022   • Pfizer Gray Cap SARS-CoV-2 04/15/2022   • Pneumococcal conjugate vaccine, 20-valent (PREVNAR 20) 09/27/2024   • Pneumococcal polysaccharide vaccine, 23-valent, age 2 years and older (PNEUMOVAX 23) 02/07/2018   • Tdap vaccine, age 7 year and older (BOOSTRIX, ADACEL) 06/12/2019, 08/28/2019, 09/11/2019     Patient's medical history was reviewed and updated either before or during this encounter.       Rajat Samano MD

## 2024-12-10 ENCOUNTER — OFFICE VISIT (OUTPATIENT)
Dept: PRIMARY CARE | Facility: CLINIC | Age: 54
End: 2024-12-10
Payer: COMMERCIAL

## 2024-12-10 VITALS
HEART RATE: 72 BPM | OXYGEN SATURATION: 98 % | SYSTOLIC BLOOD PRESSURE: 132 MMHG | DIASTOLIC BLOOD PRESSURE: 84 MMHG | WEIGHT: 207 LBS | HEIGHT: 74 IN | TEMPERATURE: 97 F | BODY MASS INDEX: 26.56 KG/M2

## 2024-12-10 DIAGNOSIS — F41.9 ANXIETY DISORDER, UNSPECIFIED TYPE: ICD-10-CM

## 2024-12-10 DIAGNOSIS — Z00.00 ANNUAL PHYSICAL EXAM: Primary | ICD-10-CM

## 2024-12-10 DIAGNOSIS — E11.9 DIABETES MELLITUS WITHOUT COMPLICATION (MULTI): ICD-10-CM

## 2024-12-10 DIAGNOSIS — E55.9 VITAMIN D DEFICIENCY: ICD-10-CM

## 2024-12-10 DIAGNOSIS — E78.5 DYSLIPIDEMIA: ICD-10-CM

## 2024-12-10 DIAGNOSIS — N52.9 ERECTILE DYSFUNCTION, UNSPECIFIED ERECTILE DYSFUNCTION TYPE: ICD-10-CM

## 2024-12-10 LAB — POC HEMOGLOBIN A1C: 7.8 % (ref 4.2–6.5)

## 2024-12-10 PROCEDURE — 3075F SYST BP GE 130 - 139MM HG: CPT | Performed by: INTERNAL MEDICINE

## 2024-12-10 PROCEDURE — 1036F TOBACCO NON-USER: CPT | Performed by: INTERNAL MEDICINE

## 2024-12-10 PROCEDURE — 99214 OFFICE O/P EST MOD 30 MIN: CPT | Performed by: INTERNAL MEDICINE

## 2024-12-10 PROCEDURE — 95251 CONT GLUC MNTR ANALYSIS I&R: CPT | Performed by: INTERNAL MEDICINE

## 2024-12-10 PROCEDURE — 3008F BODY MASS INDEX DOCD: CPT | Performed by: INTERNAL MEDICINE

## 2024-12-10 PROCEDURE — 83036 HEMOGLOBIN GLYCOSYLATED A1C: CPT | Performed by: INTERNAL MEDICINE

## 2024-12-10 PROCEDURE — 3079F DIAST BP 80-89 MM HG: CPT | Performed by: INTERNAL MEDICINE

## 2024-12-10 RX ORDER — INSULIN DEGLUDEC 100 U/ML
30 INJECTION, SOLUTION SUBCUTANEOUS DAILY
Qty: 15 ML | Refills: 3 | Status: SHIPPED | OUTPATIENT
Start: 2024-12-10

## 2024-12-10 RX ORDER — TIRZEPATIDE 2.5 MG/.5ML
2.5 INJECTION, SOLUTION SUBCUTANEOUS WEEKLY
Qty: 2 ML | Refills: 11 | Status: SHIPPED | OUTPATIENT
Start: 2024-12-10

## 2024-12-10 ASSESSMENT — PAIN SCALES - GENERAL: PAINLEVEL_OUTOF10: 0-NO PAIN

## 2024-12-10 NOTE — PROGRESS NOTES
"    Physical Therapy Treatment    Patient Name: Doug Peter  MRN: [id number]  Encounter date:  12/11/2024  Time Calculation  Start Time: 0818  Stop Time: 0902  Time Calculation (min): 44 min     PT Therapeutic Procedures Time Entry  Manual Therapy Time Entry: 20  Therapeutic Exercise Time Entry: 24    Visit Number:  7 (including evaluation)   Planned total visits: 12 visits  Visits Authorized/Insurance Coverage:  NO AUTH, 40.00 CO PAY, 100% COVERAGE, 60V PT/OT/ST, 3250 OO     Current Problem  Problem List Items Addressed This Visit             ICD-10-CM    Cervicalgia M54.2       Precautions  Precautions  Precautions Comment: None    Pain  Pain Assessment: 0-10  0-10 (Numeric) Pain Score: 4    Subjective  Pt has been feeling better overall since last week. R side of neck and shoulder has been less tight. Pt was removing car batteries over the weekend and did have some tightness.    Objective  Trigger point R>L levator scap. Hypomobile B scapula, T4-T8     Treatment:  Therapeutic Exercise  Therapeutic Exercise Performed: Yes  UBE L1 x90\" fwd/bwd    Flexion wall slides x15  Self first rib mobs with strap x10   Pushup+ to wall x10  Serratus press 3# wand x15  Band horizontal abduction in supine x10 10#  Seated mid trap stretch 3x30\"    Manual Therapy  Manual Therapy Performed: Yes  Supine:  TpR B upper trap and levator scap  First rib mobs B grade III    Educated, demonstrated use of lacrosse ball or theracane for self masasge    Current HEP:  Access Code: [email or web address]  URL: [email or web address]  Date: 10/28/2024  Prepared by: Stephen Myers    Exercises  - Supine Shoulder Flexion Extension AAROM with Dowel  - 1 x daily - 7 x weekly - 2 sets - 10 reps  - Seated Scalene Stretch with Towel  - 1 x daily - 7 x weekly - 1 sets - 3 reps - 20 hold  - Seated Scapular Retraction  - 1 x daily - 7 x weekly - 2 sets - 10 reps - 3-5 seond hold  - Seated Cervical Retraction  - 1 x daily - 7 x weekly - 2 sets - 10 reps - 3-5 " seconds hold  - Corner Pec Minor Stretch  - 1 x daily - 7 x weekly - 1 sets - 3 reps - 30 hold    Access Code: [email or web address]  URL: [email or web address]  Date: 12/06/2024  Prepared by: Stephen Myers    Exercises  - First Rib Mobilization with Strap  - 1 x daily - 7 x weekly - 2 sets - 10 reps  - Shoulder External Rotation and Scapular Retraction with Resistance  - 1 x daily - 7 x weekly - 2 sets - 10 reps  - Standing Shoulder Horizontal Abduction with Resistance  - 1 x daily - 7 x weekly - 2 sets - 10 reps  - Wall Push Up with Plus  - 1 x daily - 7 x weekly - 2 sets - 10 reps    Assessment:  Pt's response to treatment: Pt with more tightness/pain today from increased activity at home. Pt responded well to manual techniques and was educated on self massage at home to manage flare ups. Pt showed improving postural awareness with exercise but was somewhat limited by medial scapular symptoms.   Areas of improvements: symptom relief with treatment   Limitations/deficits: increased pain with heavier activities at home     Pain end of session: 2/10    Plan:  OP PT Plan  Treatment/Interventions: Dry needling, Electrical stimulation, Manual therapy, Mechanical traction, Neuromuscular re-education, Taping techniques, Ultrasound, Therapeutic activities, Therapeutic exercises  PT Plan: Skilled PT  PT Frequency: 2 times per week  Duration: 12 visits  Certification Period Start Date: 10/28/24  Certification Period End Date: 01/26/25  Number of Treatments Authorized: 60/yr  Rehab Potential: Good  Plan of Care Agreement: Patient  Continue with current POC/no changes    Assessment of current progress against goals:  Progressing toward functional goals    Goals:  Active       PT Problem       STG, 6 visits       Start:  10/28/24    Expected End:  12/12/24       Pt will be independent in HEP to improve posture, mobility, and function.  Pt will report 50% reduction in pain with working at desk.   Pt will demonstrate appropriate  sitting and standing posture at the beginning and throughout a session with minimal cues for decreased cervical strain during functional tasks.          Pt will demonstrate full cervical AROM in all planes without pain for improved cervical mobility during ADLs.        Start:  10/28/24    Expected End:  01/26/25            Pt will increase strength in BUEs by 1/2 MMT in all planes for improved performance of functional activities.        Start:  10/28/24    Expected End:  01/26/25            Pt will perform medium weight lifting tasks with appropriate posture and without pain for improved performance of household tasks.        Start:  10/28/24    Expected End:  01/26/25            Pt will sit and work at desk for up to 2 hours with appropriate ergonomic setup and without limitation by pain.         Start:  10/28/24    Expected End:  01/26/25            Pt will demonstrate appropriate sitting, standing, and dynamic posture at the beginning and throughout a session without cues for decreased cervical strain during functional tasks.         Start:  10/28/24    Expected End:  01/26/25

## 2024-12-11 ENCOUNTER — TREATMENT (OUTPATIENT)
Dept: PHYSICAL THERAPY | Facility: CLINIC | Age: 54
End: 2024-12-11
Payer: COMMERCIAL

## 2024-12-11 DIAGNOSIS — M54.2 CERVICALGIA: ICD-10-CM

## 2024-12-11 PROCEDURE — 97110 THERAPEUTIC EXERCISES: CPT | Mod: GP

## 2024-12-11 PROCEDURE — 97140 MANUAL THERAPY 1/> REGIONS: CPT | Mod: GP

## 2024-12-11 ASSESSMENT — PAIN SCALES - GENERAL: PAINLEVEL_OUTOF10: 4

## 2024-12-11 ASSESSMENT — PAIN - FUNCTIONAL ASSESSMENT: PAIN_FUNCTIONAL_ASSESSMENT: 0-10

## 2024-12-13 ENCOUNTER — APPOINTMENT (OUTPATIENT)
Dept: PHYSICAL THERAPY | Facility: CLINIC | Age: 54
End: 2024-12-13
Payer: COMMERCIAL

## 2024-12-13 NOTE — PROGRESS NOTES
"    Physical Therapy Treatment    Patient Name: Doug Peter  MRN: [id number]  Encounter date:  12/16/2024  Time Calculation  Start Time: 1300  Stop Time: 1345  Time Calculation (min): 45 min     PT Therapeutic Procedures Time Entry  Manual Therapy Time Entry: 30  Therapeutic Exercise Time Entry: 15    Visit Number:  8 (including evaluation)   Planned total visits: 12 visits  Visits Authorized/Insurance Coverage:  NO AUTH, 40.00 CO PAY, 100% COVERAGE, 60V PT/OT/ST, 3250 OO     Current Problem  Problem List Items Addressed This Visit             ICD-10-CM    Cervicalgia M54.2         Precautions  Precautions  Precautions Comment: None    Pain  Pain Assessment: 0-10  0-10 (Numeric) Pain Score: 5 - Moderate pain    Subjective  Pain is a little higher today, pt was working on his car and thinks this may have contributed. Front of chest/shoulder around collar bone still gets sore.    Objective  Tight/tender along R scalenes and clavicle, trigger points R>L upper trap     Treatment:  Therapeutic Exercise  Therapeutic Exercise Performed: Yes  UBE L1 x90\" fwd/bwd    Serratus press 3# wand x15  Band horizontal abduction in supine 2x10 7#  Band B ER 2x10 7#  B shoulder flexion AAROM in supine x10 3#     Manual Therapy  Manual Therapy Performed: Yes  Supine:  TpR B upper trap and levator scap  First rib mobs B grade III  Sternoclavicular mobs inferior and superior grade II       Current HEP:  Access Code: [email or web address]  URL: [email or web address]  Date: 10/28/2024  Prepared by: Stephen Myers    Exercises  - Supine Shoulder Flexion Extension AAROM with Dowel  - 1 x daily - 7 x weekly - 2 sets - 10 reps  - Seated Scalene Stretch with Towel  - 1 x daily - 7 x weekly - 1 sets - 3 reps - 20 hold  - Seated Scapular Retraction  - 1 x daily - 7 x weekly - 2 sets - 10 reps - 3-5 seond hold  - Seated Cervical Retraction  - 1 x daily - 7 x weekly - 2 sets - 10 reps - 3-5 seconds hold  - Corner Pec Minor Stretch  - 1 x daily - 7 x " weekly - 1 sets - 3 reps - 30 hold    Access Code: [email or web address]  URL: [email or web address]  Date: 12/06/2024  Prepared by: Stephen Myers    Exercises  - First Rib Mobilization with Strap  - 1 x daily - 7 x weekly - 2 sets - 10 reps  - Shoulder External Rotation and Scapular Retraction with Resistance  - 1 x daily - 7 x weekly - 2 sets - 10 reps  - Standing Shoulder Horizontal Abduction with Resistance  - 1 x daily - 7 x weekly - 2 sets - 10 reps  - Wall Push Up with Plus  - 1 x daily - 7 x weekly - 2 sets - 10 reps    Assessment:  Pt's response to treatment: More time spent with manual techniques due to higher symptoms. Pt sore initially but improved over time, felt less pain and better mobility at end of visit. Plan to continue postural exercise as able moving forward.   Areas of improvements: symptom relief with treatment   Limitations/deficits: posture, pain with activity     Pain end of session: 3/10    Plan:  OP PT Plan  Treatment/Interventions: Dry needling, Electrical stimulation, Manual therapy, Mechanical traction, Neuromuscular re-education, Taping techniques, Ultrasound, Therapeutic activities, Therapeutic exercises  PT Plan: Skilled PT  PT Frequency: 2 times per week  Duration: 12 visits  Certification Period Start Date: 10/28/24  Certification Period End Date: 01/26/25  Number of Treatments Authorized: 60/yr  Rehab Potential: Good  Plan of Care Agreement: Patient  Continue with current POC/no changes    Assessment of current progress against goals:  Progressing toward functional goals    Goals:  Active       PT Problem       STG, 6 visits       Start:  10/28/24    Expected End:  12/12/24       Pt will be independent in HEP to improve posture, mobility, and function.  Pt will report 50% reduction in pain with working at desk.   Pt will demonstrate appropriate sitting and standing posture at the beginning and throughout a session with minimal cues for decreased cervical strain during functional tasks.           Pt will demonstrate full cervical AROM in all planes without pain for improved cervical mobility during ADLs.        Start:  10/28/24    Expected End:  01/26/25            Pt will increase strength in BUEs by 1/2 MMT in all planes for improved performance of functional activities.        Start:  10/28/24    Expected End:  01/26/25            Pt will perform medium weight lifting tasks with appropriate posture and without pain for improved performance of household tasks.        Start:  10/28/24    Expected End:  01/26/25            Pt will sit and work at desk for up to 2 hours with appropriate ergonomic setup and without limitation by pain.         Start:  10/28/24    Expected End:  01/26/25            Pt will demonstrate appropriate sitting, standing, and dynamic posture at the beginning and throughout a session without cues for decreased cervical strain during functional tasks.         Start:  10/28/24    Expected End:  01/26/25               home care

## 2024-12-16 ENCOUNTER — TREATMENT (OUTPATIENT)
Dept: PHYSICAL THERAPY | Facility: CLINIC | Age: 54
End: 2024-12-16
Payer: COMMERCIAL

## 2024-12-16 DIAGNOSIS — M54.2 CERVICALGIA: ICD-10-CM

## 2024-12-16 PROCEDURE — 97110 THERAPEUTIC EXERCISES: CPT | Mod: GP

## 2024-12-16 PROCEDURE — 97140 MANUAL THERAPY 1/> REGIONS: CPT | Mod: GP

## 2024-12-16 ASSESSMENT — PAIN - FUNCTIONAL ASSESSMENT: PAIN_FUNCTIONAL_ASSESSMENT: 0-10

## 2024-12-16 ASSESSMENT — PAIN SCALES - GENERAL: PAINLEVEL_OUTOF10: 5 - MODERATE PAIN

## 2024-12-19 NOTE — PROGRESS NOTES
Physical Therapy Treatment    Patient Name: Doug Peter  MRN: [id number]  Encounter date:  12/20/2024  Time Calculation  Start Time: 0816  Stop Time: 0859  Time Calculation (min): 43 min     PT Therapeutic Procedures Time Entry  Manual Therapy Time Entry: 30  Therapeutic Exercise Time Entry: 13    Visit Number:  9 (including evaluation)   Planned total visits: 12 visits  Visits Authorized/Insurance Coverage:  NO AUTH, 40.00 CO PAY, 100% COVERAGE, 60V PT/OT/ST, 3250 OO     Current Problem  Problem List Items Addressed This Visit             ICD-10-CM    Cervicalgia M54.2       Precautions  Precautions  Precautions Comment: None    Pain  Pain Assessment: 0-10  0-10 (Numeric) Pain Score: 3    Subjective  Pain/soreness has been up again this week, pt is unsure if it is from manual treatment last visit or from being active at home. Pain continues to be present around collar bone.     Objective  Remains stiff/hypomobile around R first rib and R sternocalvicular joint     Treatment:  Therapeutic Exercise  Therapeutic Exercise Performed: Yes  Serratus press 4# wand x15  B shoulder flexion AAROM in supine x10 4#   Flexion wall slides x10  Wall pushup x10     Manual Therapy  Manual Therapy Performed: Yes  Supine:  TpR B upper trap and levator scap  First rib mobs B grade III  Sternoclavicular mobs inferior and superior grade II   Cervical distraction  Median nerve freedom ADAME      Current HEP:  Access Code: [email or web address]  URL: [email or web address]  Date: 10/28/2024  Prepared by: Stephen Myers    Exercises  - Supine Shoulder Flexion Extension AAROM with Dowel  - 1 x daily - 7 x weekly - 2 sets - 10 reps  - Seated Scalene Stretch with Towel  - 1 x daily - 7 x weekly - 1 sets - 3 reps - 20 hold  - Seated Scapular Retraction  - 1 x daily - 7 x weekly - 2 sets - 10 reps - 3-5 seond hold  - Seated Cervical Retraction  - 1 x daily - 7 x weekly - 2 sets - 10 reps - 3-5 seconds hold  - Corner Pec Minor Stretch  - 1 x daily - 7  x weekly - 1 sets - 3 reps - 30 hold    Access Code: [email or web address]  URL: [email or web address]  Date: 12/06/2024  Prepared by: Stephen Myers    Exercises  - First Rib Mobilization with Strap  - 1 x daily - 7 x weekly - 2 sets - 10 reps  - Shoulder External Rotation and Scapular Retraction with Resistance  - 1 x daily - 7 x weekly - 2 sets - 10 reps  - Standing Shoulder Horizontal Abduction with Resistance  - 1 x daily - 7 x weekly - 2 sets - 10 reps  - Wall Push Up with Plus  - 1 x daily - 7 x weekly - 2 sets - 10 reps    Assessment:  Pt's response to treatment: Pt with good relief in tension and tightness with manual techniques, no soreness following compared to last visit. Exercise focused on promoting mobility with further relief felt.   Areas of improvements: symptom improvement   Limitations/deficits: pain with heavy use of UE    Pain end of session: 1/10    Plan:  OP PT Plan  Treatment/Interventions: Dry needling, Electrical stimulation, Manual therapy, Mechanical traction, Neuromuscular re-education, Taping techniques, Ultrasound, Therapeutic activities, Therapeutic exercises  PT Plan: Skilled PT  PT Frequency: 2 times per week  Duration: 12 visits  Certification Period Start Date: 10/28/24  Certification Period End Date: 01/26/25  Number of Treatments Authorized: 60/yr  Rehab Potential: Good  Plan of Care Agreement: Patient  Continue with current POC/no changes    Assessment of current progress against goals:  Progressing toward functional goals    Goals:  Active       PT Problem       STG, 6 visits       Start:  10/28/24    Expected End:  12/12/24       Pt will be independent in HEP to improve posture, mobility, and function.  Pt will report 50% reduction in pain with working at desk.   Pt will demonstrate appropriate sitting and standing posture at the beginning and throughout a session with minimal cues for decreased cervical strain during functional tasks.          Pt will demonstrate full cervical  AROM in all planes without pain for improved cervical mobility during ADLs.        Start:  10/28/24    Expected End:  01/26/25            Pt will increase strength in BUEs by 1/2 MMT in all planes for improved performance of functional activities.        Start:  10/28/24    Expected End:  01/26/25            Pt will perform medium weight lifting tasks with appropriate posture and without pain for improved performance of household tasks.        Start:  10/28/24    Expected End:  01/26/25            Pt will sit and work at desk for up to 2 hours with appropriate ergonomic setup and without limitation by pain.         Start:  10/28/24    Expected End:  01/26/25            Pt will demonstrate appropriate sitting, standing, and dynamic posture at the beginning and throughout a session without cues for decreased cervical strain during functional tasks.         Start:  10/28/24    Expected End:  01/26/25

## 2024-12-20 ENCOUNTER — TREATMENT (OUTPATIENT)
Dept: PHYSICAL THERAPY | Facility: CLINIC | Age: 54
End: 2024-12-20
Payer: COMMERCIAL

## 2024-12-20 DIAGNOSIS — M54.2 CERVICALGIA: ICD-10-CM

## 2024-12-20 PROCEDURE — 97140 MANUAL THERAPY 1/> REGIONS: CPT | Mod: GP

## 2024-12-20 PROCEDURE — 97110 THERAPEUTIC EXERCISES: CPT | Mod: GP

## 2024-12-20 ASSESSMENT — PAIN - FUNCTIONAL ASSESSMENT: PAIN_FUNCTIONAL_ASSESSMENT: 0-10

## 2024-12-20 ASSESSMENT — PAIN SCALES - GENERAL: PAINLEVEL_OUTOF10: 3

## 2024-12-30 ENCOUNTER — TREATMENT (OUTPATIENT)
Dept: PHYSICAL THERAPY | Facility: CLINIC | Age: 54
End: 2024-12-30
Payer: COMMERCIAL

## 2024-12-30 DIAGNOSIS — M54.2 CERVICALGIA: ICD-10-CM

## 2024-12-30 PROCEDURE — 97140 MANUAL THERAPY 1/> REGIONS: CPT | Mod: GP

## 2024-12-30 PROCEDURE — 97110 THERAPEUTIC EXERCISES: CPT | Mod: GP

## 2024-12-30 ASSESSMENT — PAIN - FUNCTIONAL ASSESSMENT: PAIN_FUNCTIONAL_ASSESSMENT: 0-10

## 2024-12-30 ASSESSMENT — PAIN SCALES - GENERAL: PAINLEVEL_OUTOF10: 3

## 2024-12-30 NOTE — PROGRESS NOTES
Physical Therapy Treatment    Patient Name: Doug Peter  MRN: [id number]  Encounter date:  12/30/2024  Time Calculation  Start Time: 0931  Stop Time: 1014  Time Calculation (min): 43 min     PT Therapeutic Procedures Time Entry  Manual Therapy Time Entry: 29  Therapeutic Exercise Time Entry: 14    Visit Number:  10 (including evaluation)   Planned total visits: 12 visits  Visits Authorized/Insurance Coverage:  NO AUTH, 40.00 CO PAY, 100% COVERAGE, 60V PT/OT/ST, 3250 OOP     Current Problem  Problem List Items Addressed This Visit             ICD-10-CM    Cervicalgia M54.2         Precautions  Precautions  Precautions Comment: None    Pain  Pain Assessment: 0-10  0-10 (Numeric) Pain Score: 3    Subjective  Pt reports improved pain and tightness overall but some remains around R upper trap. Activity tolerance is better as long as he focuses on posture. He has no visits scheduled after today, would like to continue with independent exercise.      Objective  TTP R upper trap, trigger points noted    Treatment:  Therapeutic Exercise  Therapeutic Exercise Performed: Yes  Serratus press 4# wand x15  B shoulder flexion AAROM in supine x10 4#   First rib mobs with strap x10   Flexion wall slides x10  Wall pushup x10     Manual Therapy  Manual Therapy Performed: Yes  Supine:  TpR B upper trap and levator scap  First rib mobs B grade III  Sternoclavicular mobs inferior and superior grade II   Cervical distraction  Median nerve freedom ADAME      Current HEP:  Access Code: [email or web address]  URL: [email or web address]  Date: 10/28/2024  Prepared by: Stephen Myers    Exercises  - Supine Shoulder Flexion Extension AAROM with Dowel  - 1 x daily - 7 x weekly - 2 sets - 10 reps  - Seated Scalene Stretch with Towel  - 1 x daily - 7 x weekly - 1 sets - 3 reps - 20 hold  - Seated Scapular Retraction  - 1 x daily - 7 x weekly - 2 sets - 10 reps - 3-5 seond hold  - Seated Cervical Retraction  - 1 x daily - 7 x weekly - 2 sets - 10 reps -  3-5 seconds hold  - Corner Pec Minor Stretch  - 1 x daily - 7 x weekly - 1 sets - 3 reps - 30 hold    Access Code: [email or web address]  URL: [email or web address]  Date: 12/06/2024  Prepared by: Stephen Myers    Exercises  - First Rib Mobilization with Strap  - 1 x daily - 7 x weekly - 2 sets - 10 reps  - Shoulder External Rotation and Scapular Retraction with Resistance  - 1 x daily - 7 x weekly - 2 sets - 10 reps  - Standing Shoulder Horizontal Abduction with Resistance  - 1 x daily - 7 x weekly - 2 sets - 10 reps  - Wall Push Up with Plus  - 1 x daily - 7 x weekly - 2 sets - 10 reps    Assessment:  Pt's response to treatment: Manual techniques continued to address pain and spasm, mobility and pain better following. Pt felt minimal symptoms with exercise following. Pt requests today to be last visit, will be placed on hold 30 days to continue with HEP. Pt may return within that time if symptoms indicate need fore continued care.  Areas of improvements: symptom relief   Limitations/deficits: spasm with heavy activity     Pain end of session: 1/10    Plan:  OP PT Plan  Treatment/Interventions: Dry needling, Electrical stimulation, Manual therapy, Mechanical traction, Neuromuscular re-education, Taping techniques, Ultrasound, Therapeutic activities, Therapeutic exercises  PT Plan: Skilled PT  PT Frequency: 2 times per week  Duration: 12 visits  Certification Period Start Date: 10/28/24  Certification Period End Date: 01/26/25  Number of Treatments Authorized: 60/yr  Rehab Potential: Good  Plan of Care Agreement: Patient  Continue with current POC/no changes    Assessment of current progress against goals:  Progressing toward functional goals    Goals:  Active       PT Problem       STG, 6 visits       Start:  10/28/24    Expected End:  12/12/24       Pt will be independent in HEP to improve posture, mobility, and function.  Pt will report 50% reduction in pain with working at desk.   Pt will demonstrate appropriate  sitting and standing posture at the beginning and throughout a session with minimal cues for decreased cervical strain during functional tasks.          Pt will demonstrate full cervical AROM in all planes without pain for improved cervical mobility during ADLs.        Start:  10/28/24    Expected End:  01/26/25            Pt will increase strength in BUEs by 1/2 MMT in all planes for improved performance of functional activities.        Start:  10/28/24    Expected End:  01/26/25            Pt will perform medium weight lifting tasks with appropriate posture and without pain for improved performance of household tasks.        Start:  10/28/24    Expected End:  01/26/25            Pt will sit and work at desk for up to 2 hours with appropriate ergonomic setup and without limitation by pain.         Start:  10/28/24    Expected End:  01/26/25            Pt will demonstrate appropriate sitting, standing, and dynamic posture at the beginning and throughout a session without cues for decreased cervical strain during functional tasks.         Start:  10/28/24    Expected End:  01/26/25

## 2025-01-16 DIAGNOSIS — J30.1 HAY FEVER: ICD-10-CM

## 2025-01-16 RX ORDER — FLUTICASONE PROPIONATE 50 MCG
2 SPRAY, SUSPENSION (ML) NASAL DAILY
Qty: 48 ML | Refills: 3 | Status: SHIPPED | OUTPATIENT
Start: 2025-01-16

## 2025-02-14 ENCOUNTER — HOSPITAL ENCOUNTER (OUTPATIENT)
Dept: RADIOLOGY | Facility: CLINIC | Age: 55
Discharge: HOME | End: 2025-02-14
Payer: COMMERCIAL

## 2025-02-14 DIAGNOSIS — N20.0 CALCULUS OF KIDNEY: ICD-10-CM

## 2025-02-14 PROCEDURE — 74018 RADEX ABDOMEN 1 VIEW: CPT

## 2025-02-15 LAB
25(OH)D3+25(OH)D2 SERPL-MCNC: 38 NG/ML (ref 30–100)
ALBUMIN SERPL-MCNC: 4.8 G/DL (ref 3.6–5.1)
ALBUMIN/CREAT UR: 18 MG/G CREAT
ALP SERPL-CCNC: 77 U/L (ref 35–144)
ALT SERPL-CCNC: 21 U/L (ref 9–46)
ANION GAP SERPL CALCULATED.4IONS-SCNC: 12 MMOL/L (CALC) (ref 7–17)
AST SERPL-CCNC: 20 U/L (ref 10–35)
BASOPHILS # BLD AUTO: 28 CELLS/UL (ref 0–200)
BASOPHILS NFR BLD AUTO: 0.5 %
BILIRUB SERPL-MCNC: 0.5 MG/DL (ref 0.2–1.2)
BUN SERPL-MCNC: 20 MG/DL (ref 7–25)
CALCIUM SERPL-MCNC: 9.7 MG/DL (ref 8.6–10.3)
CHLORIDE SERPL-SCNC: 104 MMOL/L (ref 98–110)
CHOLEST SERPL-MCNC: 210 MG/DL
CHOLEST/HDLC SERPL: 5.1 (CALC)
CO2 SERPL-SCNC: 28 MMOL/L (ref 20–32)
CREAT SERPL-MCNC: 0.83 MG/DL (ref 0.7–1.3)
CREAT UR-MCNC: 83 MG/DL (ref 20–320)
EGFRCR SERPLBLD CKD-EPI 2021: 104 ML/MIN/1.73M2
EOSINOPHIL # BLD AUTO: 179 CELLS/UL (ref 15–500)
EOSINOPHIL NFR BLD AUTO: 3.2 %
ERYTHROCYTE [DISTWIDTH] IN BLOOD BY AUTOMATED COUNT: 14 % (ref 11–15)
EST. AVERAGE GLUCOSE BLD GHB EST-MCNC: 171 MG/DL
EST. AVERAGE GLUCOSE BLD GHB EST-SCNC: 9.5 MMOL/L
GLUCOSE SERPL-MCNC: 126 MG/DL (ref 65–99)
HBA1C MFR BLD: 7.6 % OF TOTAL HGB
HCT VFR BLD AUTO: 51.1 % (ref 38.5–50)
HDLC SERPL-MCNC: 41 MG/DL
HGB BLD-MCNC: 17 G/DL (ref 13.2–17.1)
LDLC SERPL CALC-MCNC: ABNORMAL MG/DL
LYMPHOCYTES # BLD AUTO: 1792 CELLS/UL (ref 850–3900)
LYMPHOCYTES NFR BLD AUTO: 32 %
MCH RBC QN AUTO: 28.1 PG (ref 27–33)
MCHC RBC AUTO-ENTMCNC: 33.3 G/DL (ref 32–36)
MCV RBC AUTO: 84.3 FL (ref 80–100)
MICROALBUMIN UR-MCNC: 1.5 MG/DL
MONOCYTES # BLD AUTO: 510 CELLS/UL (ref 200–950)
MONOCYTES NFR BLD AUTO: 9.1 %
NEUTROPHILS # BLD AUTO: 3091 CELLS/UL (ref 1500–7800)
NEUTROPHILS NFR BLD AUTO: 55.2 %
NONHDLC SERPL-MCNC: 169 MG/DL (CALC)
PLATELET # BLD AUTO: 273 THOUSAND/UL (ref 140–400)
PMV BLD REES-ECKER: 10.4 FL (ref 7.5–12.5)
POTASSIUM SERPL-SCNC: 4.4 MMOL/L (ref 3.5–5.3)
PROT SERPL-MCNC: 7.2 G/DL (ref 6.1–8.1)
PSA SERPL-MCNC: 0.63 NG/ML
RBC # BLD AUTO: 6.06 MILLION/UL (ref 4.2–5.8)
SODIUM SERPL-SCNC: 144 MMOL/L (ref 135–146)
TRIGL SERPL-MCNC: 441 MG/DL
TSH SERPL-ACNC: 1.2 MIU/L (ref 0.4–4.5)
WBC # BLD AUTO: 5.6 THOUSAND/UL (ref 3.8–10.8)

## 2025-02-20 ENCOUNTER — HOSPITAL ENCOUNTER (OUTPATIENT)
Dept: RADIOLOGY | Facility: CLINIC | Age: 55
Discharge: HOME | End: 2025-02-20
Payer: COMMERCIAL

## 2025-02-20 DIAGNOSIS — N21.0 CALCULUS IN BLADDER: ICD-10-CM

## 2025-02-20 PROCEDURE — 74176 CT ABD & PELVIS W/O CONTRAST: CPT

## 2025-02-28 ENCOUNTER — DOCUMENTATION (OUTPATIENT)
Dept: PHYSICAL THERAPY | Facility: CLINIC | Age: 55
End: 2025-02-28
Payer: COMMERCIAL

## 2025-02-28 NOTE — PROGRESS NOTES
"Discharge Summary    Name: Doug Peter \"Jean-Paul\"  MRN: [id number]  : 1970  Date: 25    Discharge Summary: PT    Discharge Information: Date of discharge 25 and Date of last visit 25    Therapy Summary: Pt placed on hold following last session reporting minimal pain and ability to continue independently. Pt did not return for further treatment.    Rehab Discharge Reason: Achieved all and/or the most significant goals(s)    "

## 2025-03-04 ENCOUNTER — APPOINTMENT (OUTPATIENT)
Dept: RADIOLOGY | Facility: CLINIC | Age: 55
End: 2025-03-04
Payer: COMMERCIAL

## 2025-04-21 ASSESSMENT — ENCOUNTER SYMPTOMS
ABDOMINAL PAIN: 0
FEVER: 0
SHORTNESS OF BREATH: 0

## 2025-04-21 NOTE — PROGRESS NOTES
[facility]: [facility]  PROGRESS NOTE      Doug Peter is a 55 y.o. male that is presenting today for Follow-up.    Assessment/Plan   Diagnoses and all orders for this visit:  Diabetes mellitus without complication  -     POCT glycosylated hemoglobin (Hb A1C) manually resulted  -     pioglitazone (Actos) 15 mg tablet; Take 1 tablet (15 mg) by mouth once daily.  Dyslipidemia  Anxiety disorder, unspecified type  Erectile dysfunction, unspecified erectile dysfunction type  Vitamin D deficiency  Other orders  -     Follow Up In Primary Care - Established  -     Follow Up In Primary Care - Established; Future    DM - will increase tresiba to 30 units daily and add actos - was on actos in the past and does not remember diarrhea or any other adverse effects.  Discussed monitoring for edema.    HPL on statin    Anxiety on SSRI.    Subjective   HPI  55 y.o. male here for follow up.  Had issues w/ stone - passed on their own - has new urologist at [facility].    Review of Systems   Constitutional:  Negative for fever.   Respiratory:  Negative for shortness of breath.    Cardiovascular:  Negative for chest pain.   Gastrointestinal:  Negative for abdominal pain.   All other systems reviewed and are negative.     Objective   Vitals:    04/22/25 0807   BP: 128/62   Pulse: 80   Temp: 36 °C (96.8 °F)   SpO2: 98%      Body mass index is 25.68 kg/m².  Physical Exam  Vitals reviewed.   Constitutional:       Appearance: Normal appearance.   Cardiovascular:      Rate and Rhythm: Normal rate and regular rhythm.      Heart sounds: No murmur heard.  Pulmonary:      Breath sounds: Normal breath sounds. No wheezing, rhonchi or rales.   Musculoskeletal:      Right lower leg: No edema.      Left lower leg: No edema.       Diagnostic Results   Lab Results   Component Value Date    GLUCOSE 126 (H) 02/14/2025    CALCIUM 9.7 02/14/2025     02/14/2025    K 4.4 02/14/2025    CO2 28 02/14/2025     02/14/2025    BUN 20  "02/14/2025    CREATININE 0.83 02/14/2025     Lab Results   Component Value Date    ALT 21 02/14/2025    AST 20 02/14/2025    ALKPHOS 77 02/14/2025    BILITOT 0.5 02/14/2025     Lab Results   Component Value Date    WBC 5.6 02/14/2025    HGB 17.0 02/14/2025    HCT 51.1 (H) 02/14/2025    MCV 84.3 02/14/2025     02/14/2025     Lab Results   Component Value Date    CHOL 210 (H) 02/14/2025    CHOL 171 10/28/2023    CHOL 204 (H) 06/30/2023     Lab Results   Component Value Date    HDL 41 02/14/2025    HDL 37.0 (L) 10/28/2023    HDL 35 (L) 06/30/2023     Lab Results   Component Value Date    LDLCALC  02/14/2025      Comment:         LDL cholesterol not calculated. Triglyceride levels  greater than 400 mg/dL invalidate calculated LDL results.           Reference range: <100     Desirable range <100 mg/dL for primary prevention;    <70 mg/dL for patients with CHD or diabetic patients   with > or = 2 CHD risk factors.     LDL-C is now calculated using the Rm-Refugio   calculation, which is a validated novel method providing   better accuracy than the Friedewald equation in the   estimation of LDL-C.   Rm SS et al. CEDRIC. 2013;310(19): 2631-3229   (http://education.Optasite.CooCoo/faq/WOR060)      LDLCALC 76 10/28/2023    LDLCALC  06/30/2023     Unable to report calculated LDL due to increased triglycerides. Direct     Lab Results   Component Value Date    TRIG 441 (H) 02/14/2025    TRIG 289 (H) 10/28/2023    TRIG 500 (H) 06/30/2023     No components found for: \"CHOLHDL\"  Lab Results   Component Value Date    HGBA1C 7.6 (H) 02/14/2025     Other labs not included in the list above were reviewed either before or during this encounter.    History    Medical History[1]  Surgical History[2]  Family History[3]  Social History     Socioeconomic History    Marital status:      Spouse name: Not on file    Number of children: Not on file    Years of education: Not on file    Highest education level: Not on file "   Occupational History    Not on file   Tobacco Use    Smoking status: Never    Smokeless tobacco: Never   Vaping Use    Vaping status: Never Used   Substance and Sexual Activity    Alcohol use: Never    Drug use: Never    Sexual activity: Defer   Other Topics Concern    Not on file   Social History Narrative    Not on file     Social Drivers of Health     Financial Resource Strain: Not on file   Food Insecurity: Not on file   Transportation Needs: Not on file   Physical Activity: Not on file   Stress: Not on file   Social Connections: Not on file   Intimate Partner Violence: Not on file   Housing Stability: Not on file     Allergies[4]  Medications Ordered Prior to Encounter[5]  Immunization History   Administered Date(s) Administered    COVID-19, mRNA, LNP-S, PF, 30 mcg/0.3 mL dose 03/25/2021, 04/22/2021, 10/26/2021    Flu vaccine (IIV4), preservative free *Check age/dose* 10/05/2021, 10/19/2023    Flu vaccine, quadrivalent, no egg protein, age 6 month or greater (FLUCELVAX) 10/05/2022    Flu vaccine, trivalent, preservative free, age 6 months and greater (Fluarix/Fluzone/Flulaval) 10/15/2024    Hepatitis B vaccine, adult *Check Product/Dose* 09/10/2019, 10/16/2019, 06/19/2020    Influenza, injectable, quadrivalent 10/03/2019    Influenza, seasonal, injectable 11/07/2014    Pfizer COVID-19 vaccine, 12 years and older, (30mcg/0.3mL) (Comirnaty) 11/08/2023    Pfizer COVID-19 vaccine, bivalent, age 12 years and older (30 mcg/0.3 mL) 10/19/2022    Pfizer Gray Cap SARS-CoV-2 04/15/2022    Pneumococcal conjugate vaccine, 20-valent (PREVNAR 20) 09/27/2024    Pneumococcal polysaccharide vaccine, 23-valent, age 2 years and older (PNEUMOVAX 23) 02/07/2018    SARS-CoV-2, Unspecified 04/11/2025    Tdap vaccine, age 7 year and older (BOOSTRIX, ADACEL) 06/12/2019, 08/28/2019, 09/11/2019     Patient's medical history was reviewed and updated either before or during this encounter.       Rajat Samano MD         [1] History  "reviewed. No pertinent past medical history.  [2]   Past Surgical History:  Procedure Laterality Date    LITHOTRIPSY  08/08/2013    Renal Lithotripsy    US GUIDED PERCUTANEOUS PLACEMENT  2/25/2021    US GUIDED PERCUTANEOUS PLACEMENT [facility]   [3]   Family History  Problem Relation Name Age of Onset    Hypertension Mother      Prostate cancer Father      Diabetes Father     [4]   Allergies  Allergen Reactions    Glimepiride Other     Irritability    Glipizide Other     Irritability    Linagliptin Other     Irritability    Niacin Other     Head tingles    Pioglitazone Diarrhea and Other     Myalgia   [5]   Current Outpatient Medications on File Prior to Visit   Medication Sig Dispense Refill    atorvastatin (Lipitor) 40 mg tablet Take 1 tablet (40 mg) by mouth once daily. 90 tablet 3    blood sugar diagnostic strip twice a day.      blood-glucose sensor (Dexcom G7 Sensor) device Apply one sensor every 10 days. 3 each 11    empagliflozin (Jardiance) 25 mg Take 1 tablet (25 mg) by mouth once daily. 90 tablet 3    fluticasone (Flonase) 50 mcg/actuation nasal spray ADMINISTER 2 SPRAYS INTO EACH NOSTRIL ONCE DAILY. 48 mL 3    folic acid/multivit-min/lutein (CENTRUM SILVER ORAL) Take by mouth.      insulin degludec (Tresiba FlexTouch U-100) 100 unit/mL (3 mL) injection Inject 30 Units under the skin once daily. 15 mL 3    metFORMIN (Glucophage) 1,000 mg tablet Take 1 tablet (1,000 mg) by mouth 2 times daily (morning and late afternoon). 180 tablet 3    pen needle, diabetic 32 gauge x 5/32\" needle Inject under the skin once daily.      sertraline (Zoloft) 100 mg tablet Take 1 tablet (100 mg) by mouth once daily. 90 tablet 3    [DISCONTINUED] methylcellulose, laxative, (CitruceL) 500 mg tablet Take 1 tablet (500 mg) by mouth once daily. (Patient not taking: Reported on 4/22/2025)      [DISCONTINUED] tirzepatide (Mounjaro) 2.5 mg/0.5 mL pen injector Inject 2.5 mg under the skin 1 (one) time per week. (Patient not " taking: Reported on 4/22/2025) 2 mL 11     No current facility-administered medications on file prior to visit.

## 2025-04-22 ENCOUNTER — OFFICE VISIT (OUTPATIENT)
Dept: PRIMARY CARE | Facility: CLINIC | Age: 55
End: 2025-04-22
Payer: COMMERCIAL

## 2025-04-22 VITALS
SYSTOLIC BLOOD PRESSURE: 128 MMHG | BODY MASS INDEX: 25.67 KG/M2 | WEIGHT: 200 LBS | HEART RATE: 80 BPM | TEMPERATURE: 96.8 F | HEIGHT: 74 IN | DIASTOLIC BLOOD PRESSURE: 62 MMHG | OXYGEN SATURATION: 98 %

## 2025-04-22 DIAGNOSIS — F41.9 ANXIETY DISORDER, UNSPECIFIED TYPE: ICD-10-CM

## 2025-04-22 DIAGNOSIS — E78.5 DYSLIPIDEMIA: ICD-10-CM

## 2025-04-22 DIAGNOSIS — E11.9 DIABETES MELLITUS WITHOUT COMPLICATION: Primary | ICD-10-CM

## 2025-04-22 DIAGNOSIS — E55.9 VITAMIN D DEFICIENCY: ICD-10-CM

## 2025-04-22 DIAGNOSIS — N52.9 ERECTILE DYSFUNCTION, UNSPECIFIED ERECTILE DYSFUNCTION TYPE: ICD-10-CM

## 2025-04-22 LAB — POC HEMOGLOBIN A1C: 7.8 % (ref 4.2–6.5)

## 2025-04-22 PROCEDURE — 3008F BODY MASS INDEX DOCD: CPT | Performed by: INTERNAL MEDICINE

## 2025-04-22 PROCEDURE — 99214 OFFICE O/P EST MOD 30 MIN: CPT | Performed by: INTERNAL MEDICINE

## 2025-04-22 PROCEDURE — 3078F DIAST BP <80 MM HG: CPT | Performed by: INTERNAL MEDICINE

## 2025-04-22 PROCEDURE — 83036 HEMOGLOBIN GLYCOSYLATED A1C: CPT | Performed by: INTERNAL MEDICINE

## 2025-04-22 PROCEDURE — 3074F SYST BP LT 130 MM HG: CPT | Performed by: INTERNAL MEDICINE

## 2025-04-22 PROCEDURE — 95251 CONT GLUC MNTR ANALYSIS I&R: CPT | Performed by: INTERNAL MEDICINE

## 2025-04-22 PROCEDURE — 3051F HG A1C>EQUAL 7.0%<8.0%: CPT | Performed by: INTERNAL MEDICINE

## 2025-04-22 PROCEDURE — 1036F TOBACCO NON-USER: CPT | Performed by: INTERNAL MEDICINE

## 2025-04-22 RX ORDER — PIOGLITAZONE 15 MG/1
15 TABLET ORAL DAILY
Qty: 30 TABLET | Refills: 11 | Status: SHIPPED | OUTPATIENT
Start: 2025-04-22 | End: 2026-04-22

## 2025-04-22 ASSESSMENT — PATIENT HEALTH QUESTIONNAIRE - PHQ9
2. FEELING DOWN, DEPRESSED OR HOPELESS: NOT AT ALL
1. LITTLE INTEREST OR PLEASURE IN DOING THINGS: NOT AT ALL
SUM OF ALL RESPONSES TO PHQ9 QUESTIONS 1 AND 2: 0

## 2025-04-22 ASSESSMENT — PAIN SCALES - GENERAL: PAINLEVEL_OUTOF10: 0-NO PAIN

## 2025-05-26 DIAGNOSIS — E11.9 DIABETES MELLITUS WITHOUT COMPLICATION: ICD-10-CM

## 2025-05-27 DIAGNOSIS — E11.9 DIABETES MELLITUS WITHOUT COMPLICATION: ICD-10-CM

## 2025-05-27 RX ORDER — EMPAGLIFLOZIN 25 MG/1
25 TABLET, FILM COATED ORAL DAILY
Qty: 90 TABLET | Refills: 3 | Status: SHIPPED | OUTPATIENT
Start: 2025-05-27 | End: 2025-05-27 | Stop reason: SDUPTHER

## 2025-05-27 RX ORDER — METFORMIN HYDROCHLORIDE 1000 MG/1
1000 TABLET ORAL
Qty: 180 TABLET | Refills: 3 | Status: SHIPPED | OUTPATIENT
Start: 2025-05-27 | End: 2026-05-27

## 2025-06-09 DIAGNOSIS — E11.9 DIABETES MELLITUS WITHOUT COMPLICATION: ICD-10-CM

## 2025-06-12 ENCOUNTER — OFFICE VISIT (OUTPATIENT)
Dept: ORTHOPEDIC SURGERY | Facility: HOSPITAL | Age: 55
End: 2025-06-12
Payer: COMMERCIAL

## 2025-06-12 DIAGNOSIS — M75.101 TEAR OF RIGHT ROTATOR CUFF, UNSPECIFIED TEAR EXTENT, UNSPECIFIED WHETHER TRAUMATIC: Primary | ICD-10-CM

## 2025-06-12 DIAGNOSIS — R29.898 RIGHT ARM WEAKNESS: ICD-10-CM

## 2025-06-12 DIAGNOSIS — M79.601 RIGHT ARM PAIN: ICD-10-CM

## 2025-06-12 DIAGNOSIS — M25.511 RIGHT SHOULDER PAIN, UNSPECIFIED CHRONICITY: ICD-10-CM

## 2025-06-12 DIAGNOSIS — M75.101 TEAR OF RIGHT ROTATOR CUFF, UNSPECIFIED TEAR EXTENT, UNSPECIFIED WHETHER TRAUMATIC: ICD-10-CM

## 2025-06-12 PROCEDURE — 3051F HG A1C>EQUAL 7.0%<8.0%: CPT | Performed by: ORTHOPAEDIC SURGERY

## 2025-06-12 PROCEDURE — 99213 OFFICE O/P EST LOW 20 MIN: CPT | Performed by: ORTHOPAEDIC SURGERY

## 2025-06-12 PROCEDURE — 99202 OFFICE O/P NEW SF 15 MIN: CPT | Performed by: ORTHOPAEDIC SURGERY

## 2025-06-12 PROCEDURE — 1036F TOBACCO NON-USER: CPT | Performed by: ORTHOPAEDIC SURGERY

## 2025-06-12 ASSESSMENT — PAIN SCALES - GENERAL: PAINLEVEL_OUTOF10: 6

## 2025-06-12 ASSESSMENT — ENCOUNTER SYMPTOMS
FATIGUE: 0
WHEEZING: 0
BRUISES/BLEEDS EASILY: 0
ARTHRALGIAS: 1
FEVER: 0
SHORTNESS OF BREATH: 0
CHILLS: 0
WEAKNESS: 1

## 2025-06-12 ASSESSMENT — COLUMBIA-SUICIDE SEVERITY RATING SCALE - C-SSRS
1. IN THE PAST MONTH, HAVE YOU WISHED YOU WERE DEAD OR WISHED YOU COULD GO TO SLEEP AND NOT WAKE UP?: NO
2. HAVE YOU ACTUALLY HAD ANY THOUGHTS OF KILLING YOURSELF?: NO

## 2025-06-12 ASSESSMENT — PATIENT HEALTH QUESTIONNAIRE - PHQ9
1. LITTLE INTEREST OR PLEASURE IN DOING THINGS: NOT AT ALL
2. FEELING DOWN, DEPRESSED OR HOPELESS: NOT AT ALL
SUM OF ALL RESPONSES TO PHQ9 QUESTIONS 1 AND 2: 0

## 2025-06-12 ASSESSMENT — PAIN - FUNCTIONAL ASSESSMENT: PAIN_FUNCTIONAL_ASSESSMENT: 0-10

## 2025-06-12 NOTE — PROGRESS NOTES
Reason for Appointment  Chief Complaint   Patient presents with    Right Shoulder - Follow-up     History of Present Illness  Patient is a 55 y.o. male here today for follow-up evaluation of right shoulder pain. We last saw the patient on 3/4/24 and we gave a right subacromial injection. Today he states the past injection gave him 8 months of good relief. His symptoms returned to a certain degree 6 months ago. His pain has been worsening over the past 2 months. He was having sig generalized pain. He believes this started from a lifting injury years ago and has never fully recovered. Pain in the neck. He has divided the symptoms between his shoulder and neck. He is having pain over the joint line today. No recent falls or injuries. No other changes in past medical history, allergies, or medications.      Medical History[1]    Surgical History[2]    Medication Documentation Review Audit       Reviewed by Lexi Bee MA (Medical Assistant) on 04/22/25 at 0813      Medication Order Taking? Sig Documenting Provider Last Dose Status   atorvastatin (Lipitor) 40 mg tablet [id number] Yes Take 1 tablet (40 mg) by mouth once daily. Rajat Samano MD  Active   blood sugar diagnostic strip [id number] Yes twice a day. Historical Provider, MD  Active   blood-glucose sensor (Dexcom G7 Sensor) device [id number] Yes Apply one sensor every 10 days. Rajat Samano MD  Active   empagliflozin (Jardiance) 25 mg [id number] Yes Take 1 tablet (25 mg) by mouth once daily. Rajat Samano MD  Active   fluticasone (Flonase) 50 mcg/actuation nasal spray [id number] Yes ADMINISTER 2 SPRAYS INTO EACH NOSTRIL ONCE DAILY. Rajat Samano MD  Active   folic acid/multivit-min/lutein (CENTRUM SILVER ORAL) [id number] Yes Take by mouth. Historical Provider, MD  Active   insulin degludec (Tresiba FlexTouch U-100) 100 unit/mL (3 mL) injection [id number] Yes Inject 30 Units under the skin once daily. Rajat Samano MD  Active   metFORMIN  "(Glucophage) 1,000 mg tablet [id number] Yes Take 1 tablet (1,000 mg) by mouth 2 times daily (morning and late afternoon). Rajat Samano MD  Active   methylcellulose, laxative, (CitruceL) 500 mg tablet [id number]  Take 1 tablet (500 mg) by mouth once daily.   Patient not taking: Reported on 4/22/2025    Historical Provider, MD  Flag for Review   pen needle, diabetic 32 gauge x 5/32\" needle [id number] Yes Inject under the skin once daily. Historical Provider, MD  Active   sertraline (Zoloft) 100 mg tablet [id number] Yes Take 1 tablet (100 mg) by mouth once daily. Rajat Samano MD  Active   tirzepatide (Mounjaro) 2.5 mg/0.5 mL pen injector [id number]  Inject 2.5 mg under the skin 1 (one) time per week.   Patient not taking: Reported on 4/22/2025    Rajat Samano MD  Flag for Review                    RX Allergies[3]    Review of Systems   Constitutional:  Negative for chills, fatigue and fever.   Respiratory:  Negative for shortness of breath and wheezing.    Cardiovascular:  Negative for chest pain and leg swelling.   Musculoskeletal:  Positive for arthralgias.   Allergic/Immunologic: Negative for immunocompromised state.   Neurological:  Positive for weakness.   Hematological:  Does not bruise/bleed easily.       Exam   Good pulses and sensation. Symmetric forward flexion. Good deltoid function. Weakness in external rotation. Positive impingement sign. No sig biceps joint line or ac joint tenderness.     Assessment   Right rotator cuff tear    Plan     Today we discussed conservative treatment. At this point, the patient is experiencing severe pain in the right shoulder with significant lack of function on a daily basis. Patient has tried multiple conservative modalities such as anti-inflammatory medications and home therapy exercises with no significant improvement in terms of pain levels. I will order a MRI of the right shoulder to evaluate for right rotator cuff tear, as the clinical examination and " X-ray findings have indications that the patient's condition may warrant surgical intervention. Patient will follow-up after completion of the MRI.      I, Keshia Nielson, attest that this documentation has been prepared under the direction and in the presence of Corbin Joseph MD.   By signing below, I, Corbin Joseph MD, personally performed the services described in this documentation. All medical record entries made by the scribe were at my direction and in my presence. I have reviewed the chart and agree that the record reflects my personal performance and is accurate and complete.         [1] No past medical history on file.  [2]   Past Surgical History:  Procedure Laterality Date    LITHOTRIPSY  08/08/2013    Renal Lithotripsy    US GUIDED PERCUTANEOUS PLACEMENT  2/25/2021    US GUIDED PERCUTANEOUS PLACEMENT [facility]   [3]   Allergies  Allergen Reactions    Glimepiride Other     Irritability    Glipizide Other     Irritability    Linagliptin Other     Irritability    Niacin Other     Head tingles    Pioglitazone Diarrhea and Other     Myalgia

## 2025-06-13 ENCOUNTER — HOSPITAL ENCOUNTER (OUTPATIENT)
Dept: RADIOLOGY | Facility: HOSPITAL | Age: 55
Discharge: HOME | End: 2025-06-13
Payer: COMMERCIAL

## 2025-06-13 DIAGNOSIS — R29.898 RIGHT ARM WEAKNESS: ICD-10-CM

## 2025-06-13 DIAGNOSIS — M75.101 TEAR OF RIGHT ROTATOR CUFF, UNSPECIFIED TEAR EXTENT, UNSPECIFIED WHETHER TRAUMATIC: ICD-10-CM

## 2025-06-13 DIAGNOSIS — M25.511 RIGHT SHOULDER PAIN, UNSPECIFIED CHRONICITY: ICD-10-CM

## 2025-06-13 PROCEDURE — 73221 MRI JOINT UPR EXTREM W/O DYE: CPT | Mod: RT

## 2025-06-16 ENCOUNTER — APPOINTMENT (OUTPATIENT)
Dept: ORTHOPEDIC SURGERY | Facility: CLINIC | Age: 55
End: 2025-06-16
Payer: COMMERCIAL

## 2025-06-16 DIAGNOSIS — M75.21 BICEPS TENDINITIS OF RIGHT SHOULDER: Primary | ICD-10-CM

## 2025-06-16 PROCEDURE — G8433 SCR FOR DEP NOT CPT DOC RSN: HCPCS | Performed by: ORTHOPAEDIC SURGERY

## 2025-06-16 PROCEDURE — 20550 NJX 1 TENDON SHEATH/LIGAMENT: CPT | Performed by: ORTHOPAEDIC SURGERY

## 2025-06-16 PROCEDURE — 99213 OFFICE O/P EST LOW 20 MIN: CPT | Performed by: ORTHOPAEDIC SURGERY

## 2025-06-16 PROCEDURE — 76942 ECHO GUIDE FOR BIOPSY: CPT | Performed by: ORTHOPAEDIC SURGERY

## 2025-06-16 PROCEDURE — 1036F TOBACCO NON-USER: CPT | Performed by: ORTHOPAEDIC SURGERY

## 2025-06-16 PROCEDURE — 3051F HG A1C>EQUAL 7.0%<8.0%: CPT | Performed by: ORTHOPAEDIC SURGERY

## 2025-06-16 RX ADMIN — LIDOCAINE HYDROCHLORIDE 2 ML: 10 INJECTION, SOLUTION INFILTRATION; PERINEURAL at 10:30

## 2025-06-16 RX ADMIN — METHYLPREDNISOLONE ACETATE 30 MG: 40 INJECTION, SUSPENSION INTRA-ARTICULAR; INTRALESIONAL; INTRAMUSCULAR; SOFT TISSUE at 10:30

## 2025-06-16 ASSESSMENT — ENCOUNTER SYMPTOMS
SHORTNESS OF BREATH: 0
ARTHRALGIAS: 1
CHILLS: 0
FEVER: 0
SINUS PAIN: 0
SORE THROAT: 0
FATIGUE: 0
WHEEZING: 0
JOINT SWELLING: 0
WOUND: 0

## 2025-06-16 ASSESSMENT — PAIN SCALES - GENERAL: PAINLEVEL_OUTOF10: 4

## 2025-06-16 ASSESSMENT — PAIN - FUNCTIONAL ASSESSMENT: PAIN_FUNCTIONAL_ASSESSMENT: 0-10

## 2025-06-16 NOTE — PROGRESS NOTES
"Reason for Appointment  Chief Complaint   Patient presents with    Right Shoulder - Follow-up     History of Present Illness  Patient is a 55 y.o. male here today for follow-up evaluation of continued right shoulder pain.  He had a recent MRI of the shoulder that shows AC joint DJD and mild rotator cuff tendinosis.  Today he is having mostly anterior shoulder pain, worse with raising the arm overhead and lifting.  No other changes in his past medical history, allergies, or medications.    Medical History[1]    Surgical History[2]    Medication Documentation Review Audit       Reviewed by Christie Lanier MA (Medical Assistant) on 06/16/25 at 1056      Medication Order Taking? Sig Documenting Provider Last Dose Status   atorvastatin (Lipitor) 40 mg tablet [id number] Yes Take 1 tablet (40 mg) by mouth once daily. Rajat Samano MD  Active   blood sugar diagnostic strip [id number] Yes twice a day. Historical Provider, MD Taking Active   blood-glucose sensor (Dexcom G7 Sensor) device [id number] Yes Apply one sensor every 10 days. Rajat Samano MD  Active   empagliflozin (Jardiance) 25 mg tablet [id number] Yes Take 1 tablet (25 mg) by mouth once daily. Rajat Samano MD  Active   fluticasone (Flonase) 50 mcg/actuation nasal spray [id number] Yes ADMINISTER 2 SPRAYS INTO EACH NOSTRIL ONCE DAILY. Rajat Samano MD  Active   folic acid/multivit-min/lutein (CENTRUM SILVER ORAL) [id number] Yes Take by mouth. Historical Provider, MD Taking Active   insulin degludec (Tresiba FlexTouch U-100) 100 unit/mL (3 mL) injection [id number] Yes Inject 30 Units under the skin once daily. Rajat Samano MD  Active   metFORMIN (Glucophage) 1,000 mg tablet [id number] Yes TAKE 1 TABLET (1,000 MG) BY MOUTH 2 TIMES DAILY (MORNING AND LATE AFTERNOON). Rajat Samano MD  Active   pen needle, diabetic 32 gauge x 5/32\" needle [id number] Yes Inject under the skin once daily. Historical Provider, MD Taking Active   pioglitazone (Actos) " "15 mg tablet [id number] Yes Take 1 tablet (15 mg) by mouth once daily. Rajat Samano MD  Active   sertraline (Zoloft) 100 mg tablet [id number] No Take 1 tablet (100 mg) by mouth once daily. Rajat Samano MD Taking  25 3054                    RX Allergies[3]    Review of Systems   Constitutional:  Negative for chills, fatigue and fever.   HENT:  Negative for hearing loss, sinus pain and sore throat.    Respiratory:  Negative for shortness of breath and wheezing.    Cardiovascular:  Negative for chest pain and leg swelling.   Musculoskeletal:  Positive for arthralgias. Negative for joint swelling.   Skin:  Negative for rash and wound.     Exam   On exam the right shoulder shows good active forward flexion over 150 degrees.  No significant AC joint tenderness today, minimal joint line tenderness but more tenderness over the biceps tendon sheath.  Negative impingement signs and good cuff strength with resisted external rotation.  More pain today with an open palm resistance test.  Good pulses and sensation in the upper extremity    Assessment   Right biceps tendinitis    Plan   He is having mostly biceps tendinitis symptoms today. We discussed conservative treatment today with a simple injection into the biceps tendon sheath to calm down his symptoms.  We sterilely injected under ultrasound guidance Depo-Medrol lidocaine into the right biceps tendon sheath.  Patient understands the small risk of and the signs to look for as well as flare action.  Hopefully this gives him good relief.  He can follow-up with us as needed  Patient ID: Doug Peter \"Emmanuelle" is a 55 y.o. male.    Tendon Sheath Injection: right long head of biceps tendon sheath on 2025 10:30 AM  Indications: pain  Details: 25 G needle, ultrasound-guided  Medications: 2 mL lidocaine 10 mg/mL (1 %); 30 mg methylPREDNISolone acetate 40 mg/mL  Outcome: tolerated well, no immediate complications    After discussing the risks and benefits " of the procedure with proceeded with an injection. Using ultrasound guidance we identified the greater and lesser tuberosities and the biceps tendons sheath, images obtained and saved. We then sterilely injected the right biceps tendon sheath with a mixture of 30 mg of Depo-Medrol and 2 cc of 1 % lidocaine. Pt tolerated the procedure well without any adverse reactions    Procedure, treatment alternatives, risks and benefits explained, specific risks discussed. Consent was given by the patient. Immediately prior to procedure a time out was called to verify the correct patient, procedure, equipment, support staff and site/side marked as required. Patient was prepped and draped in the usual sterile fashion.         Katt GARIBAY PA-C, am acting as a scribe and attest that this documentation has been prepared under the direction and in the presence of Corbin Joseph MD.     By signing below, ICorbin MD, personally performed the services described in this documentation. All medical record entries made by the scribe were at my direction and in my presence. I have reviewed the chart and agree that the record reflects my personal performance and is accurate and complete.                     [1] History reviewed. No pertinent past medical history.  [2]   Past Surgical History:  Procedure Laterality Date    LITHOTRIPSY  08/08/2013    Renal Lithotripsy    US GUIDED PERCUTANEOUS PLACEMENT  2/25/2021    US GUIDED PERCUTANEOUS PLACEMENT [facility]   [3]   Allergies  Allergen Reactions    Glimepiride Other     Irritability    Glipizide Other     Irritability    Linagliptin Other     Irritability    Niacin Other     Head tingles    Pioglitazone Diarrhea and Other     Myalgia

## 2025-06-17 DIAGNOSIS — F41.9 ANXIETY DISORDER, UNSPECIFIED TYPE: ICD-10-CM

## 2025-06-17 RX ORDER — SERTRALINE HYDROCHLORIDE 100 MG/1
100 TABLET, FILM COATED ORAL DAILY
Qty: 90 TABLET | Refills: 3 | Status: SHIPPED | OUTPATIENT
Start: 2025-06-17

## 2025-06-18 RX ORDER — METHYLPREDNISOLONE ACETATE 40 MG/ML
30 INJECTION, SUSPENSION INTRA-ARTICULAR; INTRALESIONAL; INTRAMUSCULAR; SOFT TISSUE
Status: COMPLETED | OUTPATIENT
Start: 2025-06-16 | End: 2025-06-16

## 2025-06-18 RX ORDER — LIDOCAINE HYDROCHLORIDE 10 MG/ML
2 INJECTION, SOLUTION INFILTRATION; PERINEURAL
Status: COMPLETED | OUTPATIENT
Start: 2025-06-16 | End: 2025-06-16

## 2025-06-24 ENCOUNTER — APPOINTMENT (OUTPATIENT)
Dept: ORTHOPEDIC SURGERY | Facility: CLINIC | Age: 55
End: 2025-06-24
Payer: COMMERCIAL

## 2025-07-08 DIAGNOSIS — E11.9 DIABETES MELLITUS WITHOUT COMPLICATION: ICD-10-CM

## 2025-07-08 RX ORDER — INSULIN DEGLUDEC 100 U/ML
30 INJECTION, SOLUTION SUBCUTANEOUS DAILY
Qty: 15 EACH | Refills: 3 | Status: SHIPPED | OUTPATIENT
Start: 2025-07-08

## 2025-08-25 ASSESSMENT — ENCOUNTER SYMPTOMS
FEVER: 0
SHORTNESS OF BREATH: 0
ABDOMINAL PAIN: 0

## 2025-08-26 ENCOUNTER — OFFICE VISIT (OUTPATIENT)
Dept: PRIMARY CARE | Facility: CLINIC | Age: 55
End: 2025-08-26
Payer: COMMERCIAL

## 2025-08-26 VITALS
DIASTOLIC BLOOD PRESSURE: 69 MMHG | BODY MASS INDEX: 26.31 KG/M2 | SYSTOLIC BLOOD PRESSURE: 111 MMHG | HEIGHT: 74 IN | WEIGHT: 205 LBS | OXYGEN SATURATION: 97 % | TEMPERATURE: 97.9 F | HEART RATE: 69 BPM

## 2025-08-26 DIAGNOSIS — F41.9 ANXIETY DISORDER, UNSPECIFIED TYPE: ICD-10-CM

## 2025-08-26 DIAGNOSIS — E78.5 DYSLIPIDEMIA: ICD-10-CM

## 2025-08-26 DIAGNOSIS — E11.9 DIABETES MELLITUS WITHOUT COMPLICATION: Primary | ICD-10-CM

## 2025-08-26 LAB — POC HEMOGLOBIN A1C: 7.8 % (ref 4.2–6.5)

## 2025-08-26 PROCEDURE — 99214 OFFICE O/P EST MOD 30 MIN: CPT | Performed by: INTERNAL MEDICINE

## 2025-08-26 PROCEDURE — 3074F SYST BP LT 130 MM HG: CPT | Performed by: INTERNAL MEDICINE

## 2025-08-26 PROCEDURE — 95251 CONT GLUC MNTR ANALYSIS I&R: CPT | Performed by: INTERNAL MEDICINE

## 2025-08-26 PROCEDURE — 1036F TOBACCO NON-USER: CPT | Performed by: INTERNAL MEDICINE

## 2025-08-26 PROCEDURE — 3051F HG A1C>EQUAL 7.0%<8.0%: CPT | Performed by: INTERNAL MEDICINE

## 2025-08-26 PROCEDURE — 83036 HEMOGLOBIN GLYCOSYLATED A1C: CPT | Performed by: INTERNAL MEDICINE

## 2025-08-26 PROCEDURE — 3008F BODY MASS INDEX DOCD: CPT | Performed by: INTERNAL MEDICINE

## 2025-08-26 PROCEDURE — 3078F DIAST BP <80 MM HG: CPT | Performed by: INTERNAL MEDICINE

## 2025-08-26 RX ORDER — INSULIN LISPRO 100 [IU]/ML
5 INJECTION, SOLUTION INTRAVENOUS; SUBCUTANEOUS
Qty: 15 ML | Refills: 11 | Status: SHIPPED | OUTPATIENT
Start: 2025-08-26

## 2025-08-26 ASSESSMENT — PAIN SCALES - GENERAL: PAINLEVEL_OUTOF10: 6

## 2025-09-09 ENCOUNTER — APPOINTMENT (OUTPATIENT)
Dept: ORTHOPEDIC SURGERY | Facility: CLINIC | Age: 55
End: 2025-09-09
Payer: COMMERCIAL